# Patient Record
Sex: FEMALE | Race: WHITE | NOT HISPANIC OR LATINO | Employment: UNEMPLOYED | URBAN - METROPOLITAN AREA
[De-identification: names, ages, dates, MRNs, and addresses within clinical notes are randomized per-mention and may not be internally consistent; named-entity substitution may affect disease eponyms.]

---

## 2022-12-15 ENCOUNTER — OFFICE VISIT (OUTPATIENT)
Dept: OBGYN | Facility: CLINIC | Age: 32
End: 2022-12-15
Payer: COMMERCIAL

## 2022-12-15 VITALS
BODY MASS INDEX: 27.48 KG/M2 | HEART RATE: 107 BPM | OXYGEN SATURATION: 98 % | HEIGHT: 66 IN | DIASTOLIC BLOOD PRESSURE: 106 MMHG | SYSTOLIC BLOOD PRESSURE: 154 MMHG | WEIGHT: 171 LBS

## 2022-12-15 DIAGNOSIS — Z30.432 ENCOUNTER FOR REMOVAL OF INTRAUTERINE CONTRACEPTIVE DEVICE: Primary | ICD-10-CM

## 2022-12-15 DIAGNOSIS — Z12.4 PAP SMEAR FOR CERVICAL CANCER SCREENING: ICD-10-CM

## 2022-12-15 PROCEDURE — G0145 SCR C/V CYTO,THINLAYER,RESCR: HCPCS | Performed by: OBSTETRICS & GYNECOLOGY

## 2022-12-15 PROCEDURE — 87624 HPV HI-RISK TYP POOLED RSLT: CPT | Performed by: OBSTETRICS & GYNECOLOGY

## 2022-12-15 PROCEDURE — 58301 REMOVE INTRAUTERINE DEVICE: CPT | Performed by: OBSTETRICS & GYNECOLOGY

## 2022-12-15 RX ORDER — MIRTAZAPINE 15 MG/1
15 TABLET, FILM COATED ORAL AT BEDTIME
COMMUNITY
End: 2024-01-25

## 2022-12-15 NOTE — PROGRESS NOTES
IUD Removal:  SUBJECTIVE:    Is a pregnancy test required: No.  Was a consent obtained?  Yes    Inga Miner is a 32 year old female,, No LMP recorded. who presents today for IUD removal. Her current IUD was placed 5 ago. She has not had problems with the IUD. She requests removal of the IUD because she desires to conceive    Today's PHQ-2 Score: No flowsheet data found.    PROCEDURE:    A speculum exam was performed and the cervix was visualized. The IUD string was visualized. Using ring forceps, the string  was grasped and the IUD removed intact.    A pap smear was obtained.    POST PROCEDURE:    The patient tolerated the procedure well. Patient was discharged in stable condition.    Discussed sending pap smear for HPV typing as well and that if both pap and HPV are negative, then can have q5 year pap smears.    Call if bleeding, pain or fever occur., Birth control counseling given. and Pregnancy counseling given, including folic acid supplementation 800-1000 mg per day.    GÉNESIS LOMAS MD

## 2022-12-15 NOTE — PATIENT INSTRUCTIONS
If cycles not regular (25-35 days) by 2-3 months then let me know    Prenatal or multivitamin daily

## 2022-12-19 LAB
BKR LAB AP GYN ADEQUACY: NORMAL
BKR LAB AP GYN INTERPRETATION: NORMAL
BKR LAB AP GYN OTHER FINDINGS: NORMAL
BKR LAB AP HPV REFLEX: NORMAL
BKR LAB AP PREVIOUS ABNORMAL: NORMAL
PATH REPORT.COMMENTS IMP SPEC: NORMAL
PATH REPORT.COMMENTS IMP SPEC: NORMAL
PATH REPORT.RELEVANT HX SPEC: NORMAL

## 2022-12-21 LAB
HUMAN PAPILLOMA VIRUS 16 DNA: NEGATIVE
HUMAN PAPILLOMA VIRUS 18 DNA: NEGATIVE
HUMAN PAPILLOMA VIRUS FINAL DIAGNOSIS: NORMAL
HUMAN PAPILLOMA VIRUS OTHER HR: NEGATIVE

## 2023-01-23 ENCOUNTER — HEALTH MAINTENANCE LETTER (OUTPATIENT)
Age: 33
End: 2023-01-23

## 2023-03-13 ENCOUNTER — PRENATAL OFFICE VISIT (OUTPATIENT)
Dept: NURSING | Facility: CLINIC | Age: 33
End: 2023-03-13
Payer: COMMERCIAL

## 2023-03-13 VITALS — HEIGHT: 66 IN | BODY MASS INDEX: 27.6 KG/M2

## 2023-03-13 DIAGNOSIS — Z34.00 ENCOUNTER FOR SUPERVISION OF NORMAL FIRST PREGNANCY: Primary | ICD-10-CM

## 2023-03-13 DIAGNOSIS — Z23 NEED FOR TDAP VACCINATION: ICD-10-CM

## 2023-03-13 PROCEDURE — 99207 PR NO CHARGE NURSE ONLY: CPT

## 2023-03-13 RX ORDER — PROPRANOLOL HYDROCHLORIDE 10 MG/1
1 TABLET ORAL
COMMUNITY
Start: 2023-01-12 | End: 2024-01-25

## 2023-03-13 RX ORDER — GABAPENTIN 100 MG/1
CAPSULE ORAL
COMMUNITY
Start: 2023-02-07 | End: 2024-01-25

## 2023-03-13 RX ORDER — BUPROPION HYDROCHLORIDE 300 MG/1
300 TABLET ORAL DAILY
COMMUNITY
Start: 2023-02-14 | End: 2024-01-25

## 2023-03-13 NOTE — PROGRESS NOTES
Important Information for Provider:     New ob nurse intake by phone, first pregnancy. Handouts reviewed. Discussed genetic screening. Patient is going to [place] for her honeymoon this week, discussed air travel/ precautions while there on her trip.   Patient had her IUD removed 12/15/2022, 2 normal periods after removal. Ultrasound and NOB with Dr Lindquist 4/07/2023  She weaned herself off  her antidepressants medications with the guidance of her therapist     Caffeine intake/servings daily - 0  Calcium intake/servings daily - 3  Exercise 5 times weekly - describe ; strength training, precautions given  Sunscreen used - Yes  Seatbelts used - Yes  Guns stored in the home - No  Self Breast Exam - Yes  Pap test up to date -  Yes  Dental exam up to date -  Yes  Immunizations reviewed and up to date - Yes  Abuse: Current or Past (Physical, Sexual or Emotional) - No  Do you feel safe in your environment - Yes  Do you cope well with stress - Yes    Prenatal OB Questionnaire  Patient supplied answers from flow sheet for:  Prenatal OB Questionnaire.  Past Medical History  Have you ever received care for your mental health? : (!) Yes  Have you ever been in a major accident or suffered serious trauma?: No  Within the last year, has anyone hit, slapped, kicked or otherwise hurt you?: No  In the last year, has anyone forced you to have sex when you didn't want to?: No    Past Medical History 2   Have you ever received a blood transfusion?: No  Would you accept a blood transfusion if was medically recommended?: Yes  Does anyone in your home smoke?: No   Is your blood type Rh negative?: Unknown  Have you ever ?: No  Have you been hospitalized for a nonsurgical reason excluding normal delivery?: No  Have you ever had an abnormal pap smear?: No    Past Medical History (Continued)  Do you have a history of abnormalities of the uterus?: No  Did your mother take BARON or any other hormones when she was pregnant with you?:  No  Do you have any other problems we have not asked about which you feel may be important to this pregnancy?: No           Allergies as of 3/13/2023:    Allergies as of 03/13/2023 - Reviewed 03/13/2023   Allergen Reaction Noted     Clindamycin Rash 05/20/2013       Current medications are:  Current Outpatient Medications   Medication Sig Dispense Refill     buPROPion (WELLBUTRIN XL) 300 MG 24 hr tablet Take 300 mg by mouth daily as directed (Patient not taking: Reported on 3/13/2023)       gabapentin (NEURONTIN) 100 MG capsule TAKE 2 CAPSULES BY MOUTH TWICE DAILY FOR ANXIETY (Patient not taking: Reported on 3/13/2023)       mirtazapine (REMERON) 15 MG tablet Take 15 mg by mouth At Bedtime (Patient not taking: Reported on 3/13/2023)       propranolol (INDERAL) 10 MG tablet Take 1 tablet by mouth 2 times daily (Patient not taking: Reported on 3/13/2023)           Early ultrasound screening tool:    Does patient have irregular periods?  No  Did patient use hormonal birth control in the three months prior to positive urine pregnancy test? No  Is the patient breastfeeding?  No  Is the patient 10 weeks or greater at time of education visit?  No

## 2023-04-07 ENCOUNTER — ANCILLARY PROCEDURE (OUTPATIENT)
Dept: ULTRASOUND IMAGING | Facility: CLINIC | Age: 33
End: 2023-04-07
Attending: OBSTETRICS & GYNECOLOGY
Payer: COMMERCIAL

## 2023-04-07 ENCOUNTER — PRENATAL OFFICE VISIT (OUTPATIENT)
Dept: OBGYN | Facility: CLINIC | Age: 33
End: 2023-04-07
Attending: OBSTETRICS & GYNECOLOGY
Payer: COMMERCIAL

## 2023-04-07 VITALS — HEIGHT: 66 IN | SYSTOLIC BLOOD PRESSURE: 142 MMHG | BODY MASS INDEX: 27.6 KG/M2 | DIASTOLIC BLOOD PRESSURE: 64 MMHG

## 2023-04-07 DIAGNOSIS — Z34.00 ENCOUNTER FOR SUPERVISION OF NORMAL FIRST PREGNANCY: ICD-10-CM

## 2023-04-07 DIAGNOSIS — Z34.90 EARLY STAGE OF PREGNANCY: Primary | ICD-10-CM

## 2023-04-07 PROCEDURE — 99213 OFFICE O/P EST LOW 20 MIN: CPT | Performed by: OBSTETRICS & GYNECOLOGY

## 2023-04-07 PROCEDURE — 76817 TRANSVAGINAL US OBSTETRIC: CPT | Performed by: OBSTETRICS & GYNECOLOGY

## 2023-04-07 NOTE — PROGRESS NOTES
"Had u/s today for dating. LMP 1/31/23 and IUD was removed 12/22. +UPT 2/28 or 3/1 at home.    U/s today with small gestational sac measuring 6 weeks 2 days, no fetal pole or yolk sac seen.  No vaginal bleeding.  Should be 9 weeks 3 days by LMP.     Patient is seen here with her  and informed of the results.  Discussed this could be sign of early pregnancy loss and we will need to do a repeat ultrasound to confirm.    OBJECTIVE: Blood pressure (!) 142/64, height 1.676 m (5' 6\"), last menstrual period 01/31/2023.   Physical exam is deferred.     ASSESSMENT: Early pregnancy versus anembryonic pregnancy    PLAN: We will do a repeat ultrasound in 2 weeks and discussed should see fetal pole and cardiac activity at that point if pregnancy is good and just dated incorrectly.    If ultrasound is the same, discussed medication management versus surgical management at today's visit.  Also reviewed she could start experiencing bleeding between now and her scheduled ultrasound which would indicate miscarriage.    Discussed taking ibuprofen if has bleeding and to send us a [employer] message so we can let her know plan for follow-up like UPT in a few weeks, cancel ultrasound, etc. also discussed would check early quants and early ultrasound with her next pregnancy.  Questions were answered.    Lavonne Lindquist MD    "

## 2023-04-21 ENCOUNTER — ANCILLARY PROCEDURE (OUTPATIENT)
Dept: ULTRASOUND IMAGING | Facility: CLINIC | Age: 33
End: 2023-04-21
Attending: OBSTETRICS & GYNECOLOGY
Payer: COMMERCIAL

## 2023-04-21 ENCOUNTER — PRENATAL OFFICE VISIT (OUTPATIENT)
Dept: OBGYN | Facility: CLINIC | Age: 33
End: 2023-04-21
Attending: OBSTETRICS & GYNECOLOGY
Payer: COMMERCIAL

## 2023-04-21 VITALS
BODY MASS INDEX: 26.44 KG/M2 | DIASTOLIC BLOOD PRESSURE: 78 MMHG | SYSTOLIC BLOOD PRESSURE: 127 MMHG | WEIGHT: 163.8 LBS | HEART RATE: 91 BPM

## 2023-04-21 DIAGNOSIS — Z34.90 EARLY STAGE OF PREGNANCY: ICD-10-CM

## 2023-04-21 DIAGNOSIS — O03.9 SPONTANEOUS ABORTION: Primary | ICD-10-CM

## 2023-04-21 PROCEDURE — 76817 TRANSVAGINAL US OBSTETRIC: CPT | Performed by: OBSTETRICS & GYNECOLOGY

## 2023-04-21 PROCEDURE — 99213 OFFICE O/P EST LOW 20 MIN: CPT | Performed by: OBSTETRICS & GYNECOLOGY

## 2023-04-21 RX ORDER — MIFEPRISTONE 200 MG/1
200 TABLET ORAL ONCE
Status: COMPLETED | OUTPATIENT
Start: 2023-04-21 | End: 2023-04-25

## 2023-04-21 RX ORDER — ONDANSETRON 4 MG/1
4 TABLET, FILM COATED ORAL EVERY 8 HOURS PRN
Qty: 5 TABLET | Refills: 0 | Status: SHIPPED | OUTPATIENT
Start: 2023-04-21 | End: 2024-01-25

## 2023-04-21 RX ORDER — MISOPROSTOL 200 UG/1
800 TABLET ORAL ONCE
Qty: 4 TABLET | Refills: 1 | Status: SHIPPED | OUTPATIENT
Start: 2023-04-21 | End: 2023-04-21

## 2023-04-21 NOTE — PATIENT INSTRUCTIONS
You will take the first medication (Mifipristone) orally here in the office      24 hours later you will want to take the Misoprostol, you can take this medication either orally or vaginally depending on what you are more comfortable with                Vaginal: place the four tablets inside the vagina as high up as you can reach                Orally: let the pills dissolve in your cheek for 20 minutes and then swallow whatever is left with a sip of water    You should start to have cramping and vaginal bleeding within 4 to 12 hours, if you do not have any cramping or bleeding within 12 yours you can fill the refill for the Misoprostol and take a second dose.      You should expect to have bleeding like heavy period and can have some nausea and cramping. Please call us if you have heavy bleeding soaking a pad per hour for two hours in a row.      You can take Ibuprofen 600mg every 6 hours and Tylenol 1000mg every 6 hours for pain and cramping. Heating pads and a warm bath/shower will also help. I also gave you a prescription for Oxycodone which is a narcotic pain medication to use if needed and Zofran which is a prescription nausea medication.

## 2023-04-21 NOTE — PROGRESS NOTES
"GYN Progress Note     CC: SAB     HPI: Inga Miner is a 34 YO  at 11w3d by LMP who presents for follow up after an ultrasound this morning confirmed early pregnancy loss. She reports that she has been coping with the loss and denies need for further support at this time. She started to have some light spotting earlier this week but no significant bleeding or abdominal pain.     O:   Vital signs:      BP: 127/78 Pulse: 91             Weight: 74.3 kg (163 lb 12.8 oz)  Estimated body mass index is 26.44 kg/m  as calculated from the following:    Height as of 23: 1.676 m (5' 6\").    Weight as of this encounter: 74.3 kg (163 lb 12.8 oz).      Obstetrical Ultrasound Report  OB U/S  < 14 Weeks -  Transvaginal  [facility]  Referring Provider: Lavonne Lindquist MD  Sonographer: Zaina Gonsalez RDMS  Indication:  Viability check      Dating (mm/dd/yyyy):   LMP: 23                 EDC:  23  GA by LMP:         11w3d     Current Scan On:  2023            The calculation of the gestational age by current scan was based on CRL.  Anatomy Scan:  Schwarz gestation.  Biometry:  CRL                       Not seen               Yolk Sac                              Not seen   Mean gestational sac     2.00 cm       6w6d                                                               Fetal heart activity:  N/A  Findings: Irregular gestational sac in uterus, no yolk sac or fetal pole seen.     Maternal Structures:  Cervix: The cervix appears long and closed.  Right Ovary: Wnl  Left Ovary: Wnl     Impression:   Irregular gestational sac without yolk sac or fetal pole.  This is confirmatory of early pregnancy loss.     Amber Case MD       Assessment and plan:   Inga Miner is a 34 YO  who presents with SAB  (O03.9) Spontaneous   (primary encounter diagnosis)  I discussed with the patient her ultrasound findings and that the pregnancy was not viable. We " discussed that early pregnancy loss is quite common, ~15-20% of recognized pregnancies.  The most common causes of early pregnancy loss is chromosomal abnormality.  Options including expectant management, medical management and surgical management were discussed.     With expectant management pregnancy loss will occur spontaneously.  This has the benefit of least medical intervention, but onset to loss may take days to weeks and she may experience cramping and prolonged bleeding.    Medical management with Mifepristone/misoprostol has the advantage of allowing for control of timing of the onset of cramping and bleeding and generally reduces the overall length to completion of miscarriage.  Side effects including chills, nausea and diarrhea were discussed.     Surgical management with either manual vacuum aspiration in the clinic or suction aspiration in the OR depending on the patient's desire for pain control were discussed. The risk of infection, bleeding, uterine perforation and intrauterine scar formation were discussed.    I also informed her that with either expectant or medical management there is the possibility of failure of the pregnancy to pass completely requiring surgical evacuation. And with surgical evacuation there is a small risk of still needing medication or an additional to complete evacuation of the uterus.    The patient had the opportunity to ask questions, expressed understanding and desires medical management. Mifepristone was given in the office today and she was given a prescription for Misoprostol to take tomorrow. Message sent to RN pool to call on Monday for follow up. If everything goes as expected, will have patient take a home pregnancy test in 4 weeks and monitor for her next period but will obtain a repeat US if any concern for retained POCs.     Patient planing for pregnancy again in the near future, will plan for early US with future pregnancy.     Plan: miFEPRIStone (MIFEPREX)  tablet 200 mg,         misoprostol (CYTOTEC) 200 MCG tablet,         ondansetron (ZOFRAN) 4 MG tablet    Dispo: RN phone call on Monday     Tara Lu MD

## 2023-04-24 ENCOUNTER — TELEPHONE (OUTPATIENT)
Dept: OBGYN | Facility: CLINIC | Age: 33
End: 2023-04-24
Payer: COMMERCIAL

## 2023-04-24 NOTE — CONFIDENTIAL NOTE
Called patient 4/24/23  [facility] for patient to call back.        Tara Lu MD  [facility] Triage  Inga Lopez was seen in the office on 4/21 for medical management of a missed AB. She took the Mifepristone in the office today and plans to take the misoprostol tomorrow.  Can someone call her on Monday and see how she is doing? If her bleeding went as expected she will do a home pregnancy test in 4 weeks but if she has prolonged bleeding lasting longer than 10 days, has a positive home pregnancy test next month or does not get her next period as expected then we should get another ultrasound to rule out retained POCs.     Thank you!   Tara Lu MD

## 2023-04-25 ENCOUNTER — MYC MEDICAL ADVICE (OUTPATIENT)
Dept: OBGYN | Facility: CLINIC | Age: 33
End: 2023-04-25
Payer: COMMERCIAL

## 2023-04-25 PROCEDURE — S0190 MIFEPRISTONE, ORAL, 200 MG: HCPCS | Performed by: OBSTETRICS & GYNECOLOGY

## 2023-04-25 RX ADMIN — MIFEPRISTONE 200 MG: 200 TABLET ORAL at 09:16

## 2023-04-25 NOTE — CONFIDENTIAL NOTE
LVM for patient to call back.  Will sent a [employer] message to check in as well.    Trang Hamm RN

## 2023-04-25 NOTE — TELEPHONE ENCOUNTER
Discussed with Inga regarding the bleeding she experienced.  Pt states that she started bleeding the same day but still took the Miso.  Pt states that her bleeding was heavy for a few days and then it stopped.  Pt confirms spotting but no heavy bleeding. Denies cramping at this time.  Pt education on if/when to take home pregnancy.  Pt verbalized understanding and agreed to the plan.

## 2023-07-07 ENCOUNTER — ANCILLARY PROCEDURE (OUTPATIENT)
Dept: ULTRASOUND IMAGING | Facility: CLINIC | Age: 33
End: 2023-07-07
Attending: OBSTETRICS & GYNECOLOGY
Payer: COMMERCIAL

## 2023-07-07 DIAGNOSIS — N93.8 DUB (DYSFUNCTIONAL UTERINE BLEEDING): ICD-10-CM

## 2023-07-07 PROCEDURE — 76856 US EXAM PELVIC COMPLETE: CPT | Performed by: OBSTETRICS & GYNECOLOGY

## 2023-07-07 PROCEDURE — 76830 TRANSVAGINAL US NON-OB: CPT | Performed by: OBSTETRICS & GYNECOLOGY

## 2024-01-25 ENCOUNTER — OFFICE VISIT (OUTPATIENT)
Dept: OBGYN | Facility: CLINIC | Age: 34
End: 2024-01-25
Payer: COMMERCIAL

## 2024-01-25 VITALS
OXYGEN SATURATION: 95 % | HEIGHT: 66 IN | HEART RATE: 95 BPM | SYSTOLIC BLOOD PRESSURE: 140 MMHG | DIASTOLIC BLOOD PRESSURE: 98 MMHG | BODY MASS INDEX: 26.44 KG/M2

## 2024-01-25 DIAGNOSIS — N92.6 IRREGULAR MENSES: Primary | ICD-10-CM

## 2024-01-25 PROBLEM — Z23 NEED FOR TDAP VACCINATION: Status: RESOLVED | Noted: 2023-03-13 | Resolved: 2024-01-25

## 2024-01-25 PROBLEM — Z78.9 KNOWN HEALTH PROBLEMS: NONE: Status: ACTIVE | Noted: 2024-01-25

## 2024-01-25 PROCEDURE — 99214 OFFICE O/P EST MOD 30 MIN: CPT | Performed by: OBSTETRICS & GYNECOLOGY

## 2024-01-25 RX ORDER — LETROZOLE 2.5 MG/1
TABLET, FILM COATED ORAL
Qty: 10 TABLET | Refills: 0 | Status: SHIPPED | OUTPATIENT
Start: 2024-01-25 | End: 2024-04-09

## 2024-01-25 ASSESSMENT — ANXIETY QUESTIONNAIRES
3. WORRYING TOO MUCH ABOUT DIFFERENT THINGS: NOT AT ALL
IF YOU CHECKED OFF ANY PROBLEMS ON THIS QUESTIONNAIRE, HOW DIFFICULT HAVE THESE PROBLEMS MADE IT FOR YOU TO DO YOUR WORK, TAKE CARE OF THINGS AT HOME, OR GET ALONG WITH OTHER PEOPLE: SOMEWHAT DIFFICULT
6. BECOMING EASILY ANNOYED OR IRRITABLE: NOT AT ALL
7. FEELING AFRAID AS IF SOMETHING AWFUL MIGHT HAPPEN: NOT AT ALL
1. FEELING NERVOUS, ANXIOUS, OR ON EDGE: SEVERAL DAYS
5. BEING SO RESTLESS THAT IT IS HARD TO SIT STILL: NOT AT ALL
GAD7 TOTAL SCORE: 2
GAD7 TOTAL SCORE: 2
2. NOT BEING ABLE TO STOP OR CONTROL WORRYING: NOT AT ALL

## 2024-01-25 ASSESSMENT — PATIENT HEALTH QUESTIONNAIRE - PHQ9
5. POOR APPETITE OR OVEREATING: SEVERAL DAYS
SUM OF ALL RESPONSES TO PHQ QUESTIONS 1-9: 4

## 2024-01-25 NOTE — PATIENT INSTRUCTIONS
Cycle instructions:    The first day of bleeding/period is called Day 1.    Letrozole is taken Days 3-7 of cycle, about same time each day.  Take the medication even if you are still bleeding.    Call when you get your period to schedule an ultrasound to check for follicles on your ovaries.  This should be done about Day 12-14 of cycle.  (we do not have ultrasound on the weekends)  The phone number is [phone]     Start testing for ovulation using the store bought ovulation predictor kits on Day 11 of cycle.  When you get a positive surge, you will most likely be ovulating within the next 24 hours.       Test the urine about the same time each day.         The test is positive when you see 2 dark solid lines, or a smiley face.  (one faint line and one dark line is NOT positive)         Once the test is positive, you can stop testing.  Have sex/intercourse the day the test is positive.  The next day, have sex again.    Schedule a lab only appointment for about 7-9 days after your ovulation test is positive to check the progesterone level and make sure ovulation occurred.    Do an E Visit about Day 24-26 of cycle so we can review all of the labs and ultrasound results and make dose adjustments if needed.       Start the visit by telling me your LMP (last menstrual period), any side effects of letrozole and cycle day of your LH surge.    If you don't have a period by 15 days after LH kit is positive (surge) then do urine pregnancy test and call/email clinic if positive.      Basic info:  The ovulation predictor kits are found in the condom/tampon section of the store.  Clear blue easy brand is simple to read.  Most women will get a positive LH surge before day 20 of the cycle.  Letrozole is not FDA approved, but limited studies showing pregnancy rate higher than clomid especially when clomid has failed.  Aromatase inhibitors are generally well tolerated. The main side effects are hot flushes and gastrointestinal  events (nausea and vomiting), headache, back pain, and leg cramps.  Take it at same time each night to minimize side effects.  There is a chance of twins when taking letrozole as well as making too many cysts on the ovaries.  Do not use lubricants with intercourse when trying to get pregnant. (Pre-Seed, etc is okay)

## 2024-01-25 NOTE — PROGRESS NOTES
"CC: getting pregnant  HPI: Inga Miner is a 34 year old female Patient's last menstrual period was 01/08/2024. Menses are regular q 4 weeks.     The patient has been trying to conceive for 2 years. Had IUD removed 12/22     Ultrasound checked in July 2023 was normal--had to pay out of pocket    Prior pregnancy history is as follows: had SAB 4/23 and took mife/miso    After that since about June 2023 cycles have been regular.  Did some ovulation predictor kits for approximately 2 months and did not see a positive surge.  Mood is not great she is off all medications.  Has appointment to see someone and get back on something    Allergies: Clindamycin                    Past Medical History:   Diagnosis Date    Anxiety     Varicella        Past Surgical History:   Procedure Laterality Date    NO HISTORY OF SURGERY             Social History     Tobacco Use    Smoking status: Never    Smokeless tobacco: Never   Substance Use Topics    Alcohol use: Not Currently              Family History   Problem Relation Age of Onset    Rheumatoid Arthritis Mother     Diabetes Father        Current Outpatient Medications   Medication Sig Dispense Refill    letrozole (FEMARA) 2.5 MG tablet Take 2 tablets by mouth days 3-7 of cycle 10 tablet 0    Prenatal Vit-DSS-Fe Cbn-FA (PRENATAL AD PO)       magnesium oxide 200 MG TABS              PAST INFERTILITY EVALUATION AND TREATMENT:  Patient's work up has included:  Ultrasound 7/23 normal  HSG: No      Past infertility treatment included none.      EXAM:  Blood pressure (!) 140/98, pulse 95, height 1.676 m (5' 6\"), last menstrual period 01/08/2024, SpO2 95%.  BMI= Body mass index is 26.44 kg/m .  Patient's last menstrual period was 01/08/2024.  General - pleasant female in no acute distress.  Neurological - alert and oriented X 3  Psychiatric - normal mood and affect    prep time day of service 5 min  visit time with the patient 18 min  post visit work including documentation time 7 " min  Total time: 30 min    ASSESSMENT/ PLAN:  (N92.6) Irregular menses  (primary encounter diagnosis)  Comment: no +opk seen  Plan: US Pelvis Complete w Transvaginal Follicular         Init, letrozole (FEMARA) 2.5 MG tablet,         Progesterone        Discussed trying Letrozole for ovulation induction.  Not FDA approved, but limited studies showing pregnancy rate higher than clomid stephen. When clomid has failed in PCOS patients.  Aromatase inhibitors are generally well tolerated. The main side effects are hot flushes and gastrointestinal events (nausea and vomiting), headache, back pain, and leg cramps. Patient would like to try Letrozol 5mg days 3-7 of cycle.  Told to take it at same time each night and will call with menses to schedule follicle check about day 12-14 of cycle.  Start LH testing day 11.  Questions answered.  Will call/email with side effects or questions.     Discussed if not pregnant in 3 cycles would need SA/HSG and day 3 labs done. Will defer for now since money is an issue and had one pregnancy <1 yr ago.    Lavonne Lindquist MD

## 2024-01-29 ENCOUNTER — HOSPITAL ENCOUNTER (EMERGENCY)
Facility: HOSPITAL | Age: 34
Discharge: HOME | End: 2024-01-29
Attending: EMERGENCY MEDICINE | Admitting: EMERGENCY MEDICINE
Payer: COMMERCIAL

## 2024-01-29 VITALS
HEART RATE: 98 BPM | SYSTOLIC BLOOD PRESSURE: 152 MMHG | TEMPERATURE: 98.6 F | RESPIRATION RATE: 18 BRPM | DIASTOLIC BLOOD PRESSURE: 97 MMHG | OXYGEN SATURATION: 99 %

## 2024-01-29 DIAGNOSIS — K62.5 RECTAL BLEEDING: ICD-10-CM

## 2024-01-29 DIAGNOSIS — F10.10 ALCOHOL ABUSE: ICD-10-CM

## 2024-01-29 DIAGNOSIS — K70.10 ALCOHOLIC HEPATITIS WITHOUT ASCITES (H): ICD-10-CM

## 2024-01-29 LAB
ALBUMIN SERPL BCG-MCNC: 4.4 G/DL (ref 3.5–5.2)
ALP SERPL-CCNC: 148 U/L (ref 40–150)
ALT SERPL W P-5'-P-CCNC: 252 U/L (ref 0–50)
ANION GAP SERPL CALCULATED.3IONS-SCNC: 23 MMOL/L (ref 7–15)
AST SERPL W P-5'-P-CCNC: 798 U/L (ref 0–45)
BASOPHILS # BLD AUTO: 0 10E3/UL (ref 0–0.2)
BASOPHILS NFR BLD AUTO: 1 %
BILIRUB SERPL-MCNC: 1.5 MG/DL
BUN SERPL-MCNC: 5.6 MG/DL (ref 6–20)
CALCIUM SERPL-MCNC: 9.3 MG/DL (ref 8.6–10)
CHLORIDE SERPL-SCNC: 95 MMOL/L (ref 98–107)
CREAT SERPL-MCNC: 0.57 MG/DL (ref 0.51–0.95)
DEPRECATED HCO3 PLAS-SCNC: 20 MMOL/L (ref 22–29)
EGFRCR SERPLBLD CKD-EPI 2021: >90 ML/MIN/1.73M2
EOSINOPHIL # BLD AUTO: 0 10E3/UL (ref 0–0.7)
EOSINOPHIL NFR BLD AUTO: 0 %
ERYTHROCYTE [DISTWIDTH] IN BLOOD BY AUTOMATED COUNT: 13.9 % (ref 10–15)
GLUCOSE SERPL-MCNC: 84 MG/DL (ref 70–99)
HCT VFR BLD AUTO: 40 % (ref 35–47)
HGB BLD-MCNC: 13.9 G/DL (ref 11.7–15.7)
HOLD SPECIMEN: NORMAL
IMM GRANULOCYTES # BLD: 0 10E3/UL
IMM GRANULOCYTES NFR BLD: 1 %
LIPASE SERPL-CCNC: 69 U/L (ref 13–60)
LYMPHOCYTES # BLD AUTO: 1.4 10E3/UL (ref 0.8–5.3)
LYMPHOCYTES NFR BLD AUTO: 22 %
MCH RBC QN AUTO: 35.8 PG (ref 26.5–33)
MCHC RBC AUTO-ENTMCNC: 34.8 G/DL (ref 31.5–36.5)
MCV RBC AUTO: 103 FL (ref 78–100)
MONOCYTES # BLD AUTO: 0.5 10E3/UL (ref 0–1.3)
MONOCYTES NFR BLD AUTO: 8 %
NEUTROPHILS # BLD AUTO: 4.5 10E3/UL (ref 1.6–8.3)
NEUTROPHILS NFR BLD AUTO: 68 %
NRBC # BLD AUTO: 0 10E3/UL
NRBC BLD AUTO-RTO: 0 /100
PLATELET # BLD AUTO: 258 10E3/UL (ref 150–450)
POTASSIUM SERPL-SCNC: 3.9 MMOL/L (ref 3.4–5.3)
PROT SERPL-MCNC: 8.3 G/DL (ref 6.4–8.3)
RBC # BLD AUTO: 3.88 10E6/UL (ref 3.8–5.2)
SODIUM SERPL-SCNC: 138 MMOL/L (ref 135–145)
WBC # BLD AUTO: 6.5 10E3/UL (ref 4–11)

## 2024-01-29 PROCEDURE — 85025 COMPLETE CBC W/AUTO DIFF WBC: CPT | Performed by: STUDENT IN AN ORGANIZED HEALTH CARE EDUCATION/TRAINING PROGRAM

## 2024-01-29 PROCEDURE — 96374 THER/PROPH/DIAG INJ IV PUSH: CPT

## 2024-01-29 PROCEDURE — 36415 COLL VENOUS BLD VENIPUNCTURE: CPT | Performed by: STUDENT IN AN ORGANIZED HEALTH CARE EDUCATION/TRAINING PROGRAM

## 2024-01-29 PROCEDURE — 80053 COMPREHEN METABOLIC PANEL: CPT | Performed by: EMERGENCY MEDICINE

## 2024-01-29 PROCEDURE — 258N000003 HC RX IP 258 OP 636: Performed by: EMERGENCY MEDICINE

## 2024-01-29 PROCEDURE — 99284 EMERGENCY DEPT VISIT MOD MDM: CPT | Mod: 25

## 2024-01-29 PROCEDURE — 96361 HYDRATE IV INFUSION ADD-ON: CPT

## 2024-01-29 PROCEDURE — 83690 ASSAY OF LIPASE: CPT | Performed by: EMERGENCY MEDICINE

## 2024-01-29 PROCEDURE — 85025 COMPLETE CBC W/AUTO DIFF WBC: CPT | Performed by: EMERGENCY MEDICINE

## 2024-01-29 PROCEDURE — 250N000011 HC RX IP 250 OP 636: Performed by: EMERGENCY MEDICINE

## 2024-01-29 RX ORDER — ONDANSETRON 4 MG/1
4 TABLET, ORALLY DISINTEGRATING ORAL EVERY 8 HOURS PRN
Qty: 10 TABLET | Refills: 0 | Status: SHIPPED | OUTPATIENT
Start: 2024-01-29 | End: 2024-02-01

## 2024-01-29 RX ORDER — FAMOTIDINE 20 MG/1
20 TABLET, FILM COATED ORAL 2 TIMES DAILY
Qty: 30 TABLET | Refills: 0 | Status: SHIPPED | OUTPATIENT
Start: 2024-01-29 | End: 2024-02-07

## 2024-01-29 RX ORDER — CLONIDINE HYDROCHLORIDE 0.1 MG/1
0.1 TABLET ORAL 3 TIMES DAILY
Qty: 15 TABLET | Refills: 0 | Status: SHIPPED | OUTPATIENT
Start: 2024-01-29 | End: 2024-06-20

## 2024-01-29 RX ADMIN — SODIUM CHLORIDE 2000 ML: 9 INJECTION, SOLUTION INTRAVENOUS at 13:49

## 2024-01-29 RX ADMIN — FAMOTIDINE 20 MG: 10 INJECTION, SOLUTION INTRAVENOUS at 12:50

## 2024-01-29 NOTE — ED TRIAGE NOTES
Patient states she has had right upper abdominal pain and blood in stool for the past two days. Patient states she has been drinking approximately 5 white claws a night for the past two weeks. Patient states drinking has become a problem for her and starting to interfere with her everyday life.

## 2024-01-29 NOTE — PROGRESS NOTES
SW assisted LOCO with adding CD resources to Pt's AVS.     DEREK Alvarado   January 29, 2024 3:11 PM

## 2024-01-29 NOTE — DISCHARGE INSTRUCTIONS
Substance Use Disorder Direct Access Resources    It is recommended that you abstain from all mood altering chemicals. Please contact the sober support hotline ([phone]) as needed; phones are answered 24 hours a day, 7 days a week.    To access substance use treatment you must have a comprehensive assessment completed to begin any treatment program.     If uninsured, please contact your county of residence for eligibility screen to substance use disorder evaluation and treatment:    [place] - [phone]   [facility] - [phone]   [facility] [phone]   [place] - [phone]   [place]  [phone]   [place] - [phone]   [facility] [phone]   [place] - [phone]     If you have private insurance, call the customer service number on the back of your insurance card to find an in-network substance abuse use disorder assessment. The ideal provider will be a treatment facility, licensed in the [facility].     Community JASON Evaluations: Clients may call their [facility] for a full list of providers - Availability and services listed belo are subject to change, please call the provider to confirm    [facility]  [phone]  [facility] [place], [address]  *Please call the above number to schedule a comprehensive assessment for determination of level of care needs. In person and virtual appointments available Mon-Fri.    [facility], [facility] [address], [address] Floor, [address], [place] [address] (next to the outpatient lab)    Phone: [phone]   Provides bridging services to people with Opiate Use Disorders (OUD) seeking care. This is a front door to Medication Assisted Treatments (MAT), ages 16+  Walk In hours: Monday-Friday 9:00am-3:00pm    [facility]  [phone]  Walk in Assessments: Mon-Friday 7a-1:45p  [address], [place], [address]    [facility]  Central Access [phone]  [address] [place] [address]  [place], [place], [address]  *by appointment only    [facility]  [phone] (phone consultation available )  Locations in: [place], [place], [facility], and [place], [place]  Upper sorbian virtual IOP [phone] or visit [email or web address]   Also offers LGBTQ programming     [facility]  [phone]  [address] [facility]  [place], [place], [address]  *Currently only offered via telehealth - call to set up an appointment    [facility]  [address] [place], [place], [address]  [facility]  For more information to to make a referral call:  [phone]  Walk-in on   9-11 a.m.    [facility]  [phone]  [address] [place], [place], [address]  *available by appointments only    [facility]  [phone]  [address] [place], [place], [address]  *available by appointment only    [facility]  [phone]  [address] [place], [place], [address]  *walk in assessments available M-F starting at 7 am.    [facility]  [phone]  Locations: [facility], [facility], and [place]  *Walk in assessments availble M-F starting at 8 am -virtual only    [facility]  [phone]  [address] [place] [address]    [facility]  Virtual + Locations: [place], [place], [place], [place], [facility], [place], [place], [place]   [phone]  *available by appointment only    [facility]  [phone]  [address]  [place], [place], [address]    [facility]  [phone]  [address], [place], [address]  *available by appointment    [facility]  [phone]  [address] [place], [place], [address]  *walk ins available M-TH from 9-3    [facility] program: [phone]  [address]  [place], [place], [address]    [facility]  [phone]  Same day substance use disorder assessments are available Monday - Friday, via walk-in or by appointment at the [place] location.  [address], [address], [place] [address]     [facility] - adolescent and adult SUDs services  [phone]  Offer services Monday through Friday, as well as evening hours Monday through Thursday. Normally, a first appointment will be scheduled within one week  [email or web address]  Locations all over [place]    If you are intoxicated, you may be required to detox at a detox facility before starting treatment. The following are detox facilities that you can self present to. All detox facilities are able to help you complete an assessment prior to discharge if you choose:    [facility]: Arrive at a [facility] for immediate medical evaluation    [facility]: [address] [place], [place], [address].         [phone]    [facility]: [address] [place], [place], [address]  [phone]     [facility] [address] [place], [place], [address]  [phone]     [facility]: [address] [place], [place], [address], [phone]         Ways to help cope with sobriety:    -- Take prescribed medicines as scheduled  -- Keep follow-up appointments  -- Talk to others about your concerns  -- Get regular exercise  -- Practice deep breathing skills  -- Eat a healthy diet  -- Use community resources, including hotline numbers, [facility] crisis and support meetings  -- Stay sober and avoid places/people/things associated with substance use  --Maintain a daily schedule/routine  --Get at least 7-8 hours of sleep per night  --Create a list 10--20 healthy activities that you can do that are enjoyable and do not involve substance use  --Create daily goals (approx. 1-4 goals) per day and work to achieve them throughout the day.       Free  Resources:    [facility] ([facility])  [facility] connects people seeking recovery to resources that help foster and sustain long-term recovery. Whether you are seeking resources for treatment, transportation, housing, job training, education, health care or other pathways to recovery, [facility] is a great place to start.  Phone: [phone]. www.[place] Notion.Loop Survey (Great listing of all types of recovery and non-recovery related resources)    Alcoholics Anonymous  Phone: 4-703-ALCOHOL  Website: HTTP://WWW.AA.ORG/  [facility] [place] (916-403-6101 or http://aaSemmx.org)  [facility] (749-933-2182 or www.aastpaul.org)     Narcotics Anonymous  Phone: 590.313.5447  Website: www.Sunlasses.com.ng.Viadeo.    [facility] [address], [place], [place],  Phone: [phone]  Drop-in Hours: Monday-Friday 9-11:30 am. By appointment at other times.  Provides: [facility] is a drop-in center on the east side [facility] that provides a safe space for individuals who are homeless and have a history of chemical use. Sobriety is not a requirement but drugs and alcohol are not allowed on the property.  Services: Non-clients can access drop-in services such as Recovery and Harm Reduction Groups, referrals to case management, community activities, shower facilities, and a pool table. Individuals who are homeless and have chemical health needs may be eligible for enrollment into [facility]'s case management program. Clients and  work together to access benefits, treatment, health care, shelter, and external housing resources.

## 2024-01-29 NOTE — ED PROVIDER NOTES
EMERGENCY DEPARTMENT ENCOUNTER      NAME: Inga Miner  AGE: 34 year old female  YOB: 1990  MRN: [id number]  EVALUATION DATE & TIME: No admission date for patient encounter.    PCP: Outside, Provider    ED PROVIDER: Ron Giron M.D.      Chief Complaint   Patient presents with    Rectal Bleeding         FINAL IMPRESSION:  Rectal bleeding  Alcohol abuse  Alcoholic hepatitis      ED COURSE & MEDICAL DECISION MAKING:    Pertinent Labs & Imaging studies reviewed. (See chart for details)  34 year old female presents to the Emergency Department for evaluation of rectal bleeding and concerns about alcohol use.  Patient reports having bright red blood in her stools for the last few days.  Unable to quantify amounts.  Denies any perirectal discomfort.  Does report some achiness in her right upper quadrant.  Worried as she is drinking 5-6 white claws daily.  Examination done in triage area due to ED workup.  She is well-nourished follow-up female with slight hypertension and tachycardia.  Lungs are clear card exam unremarkable.  Minimal right upper quadrant tenderness.  No significant epigastric tenderness.  No suprapubic tenderness.  Patient reassured that her bleeding was not related to her alcohol use given the bright red nature of it is reflecting lower GI process.  Possibility of diverticular bleed versus internal hemorrhoid.  However given her alcohol use and upper quadrant tenderness.  Baseline labs being obtained assess for anemia related to bleeding.  Also obtain liver function test and lipase over concerns of complications of alcoholism.  Presently no indications for imaging patient appears non toxic with stable vitals signs. Overall exam is benign.  Of note her breath was somewhat ketotic suggesting dehydration.  Given her tachycardia we will proceed with fluid bolus.        12:41 PM I met with the patient for the initial interview and physical examination. Discussed plan for treatment and workup  in the ED.    1:02 PM.  Patient discussed with lab.  AST markedly elevated at nearly 800.  1:32 PM.  Bicarb slightly reduced at 20 with BUN and creatinine normal suggesting dehydration.  Electrolytes normal.  Lipase mildly elevated 69.  Alkaline phosphatase normal.  Total bilirubin slightly elevated at 1.5.  AST at 798 and ALT at 252 suggesting alcoholic hepatitis.  Will reassess after patient receives her fluid bolus.  Presently no indications for hospitalization  2:40 PM.  Situation discussed with patient.  Feeling slightly better after intravenous fluids.  Will discharge on Pepcid and Zofran for symptomatic relief.  Will also provide clonidine for help with mild alcohol withdrawal symptomatology.  Routine return precautions given.  At the conclusion of the encounter I discussed the results of all of the tests and the disposition. The questions were answered and return precautions provided. The patient or family acknowledged understanding and was agreeable with the care plan.       PPE: Provider wore paper mask.     MEDICATIONS GIVEN IN THE EMERGENCY:  Medications   famotidine (PEPCID) injection 20 mg (has no administration in time range)       NEW PRESCRIPTIONS STARTED AT TODAY'S ER VISIT  Current Discharge Medication List        START taking these medications    Details   cloNIDine (CATAPRES) 0.1 MG tablet Take 1 tablet (0.1 mg) by mouth 3 times daily  Qty: 15 tablet, Refills: 0      famotidine (PEPCID) 20 MG tablet Take 1 tablet (20 mg) by mouth 2 times daily  Qty: 30 tablet, Refills: 0      ondansetron (ZOFRAN ODT) 4 MG ODT tab Take 1 tablet (4 mg) by mouth every 8 hours as needed  Qty: 10 tablet, Refills: 0                 =================================================================    HPI        Inga Miner is a 34 year old female with a pertient medical history of anxiety, alcohol use who presents to the ED for evaluation of bright red blood in stools.  Symptoms ongoing for the last few days.   States it is mixed with her stool.  With this she has had some slight achiness in her right upper quadrant.  However does relate having 5-6 white claws daily and is worried about her alcohol use.  Denies any vomiting.  Has had some nausea.  Denies prior episodes of the rectal bleeding.  No recent constipation.      REVIEW OF SYSTEMS   Constitutional:  Denies fever, chills  Respiratory:  Denies productive cough or increased work of breathing  Cardiovascular:  Denies chest pain, palpitations  GI:  Denies abdominal pain, mild nausea, without vomiting.  Patient with blood mixed with stool   musculoskeletal:  Denies any new muscle/joint swelling  Skin:  Denies rash   Neurologic:  Denies focal weakness  All systems negative except as marked.     PAST MEDICAL HISTORY:  Past Medical History:   Diagnosis Date    Anxiety     Varicella        PAST SURGICAL HISTORY:  Past Surgical History:   Procedure Laterality Date    NO HISTORY OF SURGERY           CURRENT MEDICATIONS:      Current Facility-Administered Medications:     famotidine (PEPCID) injection 20 mg, 20 mg, Intravenous, Once, Ron Giron MD    Current Outpatient Medications:     letrozole (FEMARA) 2.5 MG tablet, Take 2 tablets by mouth days 3-7 of cycle, Disp: 10 tablet, Rfl: 0    magnesium oxide 200 MG TABS, , Disp: , Rfl:     Prenatal Vit-DSS-Fe Cbn-FA (PRENATAL AD PO), , Disp: , Rfl:     ALLERGIES:  Allergies   Allergen Reactions    Clindamycin Rash       FAMILY HISTORY:  Family History   Problem Relation Age of Onset    Rheumatoid Arthritis Mother     Diabetes Father        SOCIAL HISTORY:   Social History     Socioeconomic History    Marital status:    Tobacco Use    Smoking status: Never    Smokeless tobacco: Never   Vaping Use    Vaping Use: Never used   Substance and Sexual Activity    Alcohol use: Not Currently    Drug use: Never    Sexual activity: Yes     Partners: Male       VITALS:  Patient Vitals for the past 24 hrs:   BP Temp Temp src Pulse  Resp SpO2   01/29/24 1108 (!) 165/106 98.6  F (37  C) Temporal 116 18 96 %        PHYSICAL EXAM    Constitutional:  Awake, alert, in mild apparent distress  HENT:  Normocephalic, Atraumatic. Bilateral external ears normal. Oropharynx moist. Nose normal. Neck- Normal range of motion with no guarding, No midline cervical tenderness, Supple, No stridor.   Eyes:  PERRL, EOMI with no signs of entrapment, Conjunctiva normal, No discharge.   Respiratory:  Normal breath sounds, No respiratory distress, No wheezing.    Cardiovascular:  Normal heart rate, Normal rhythm, No appreciable rubs or gallops.   GI:  Soft, mild right upper quadrant tenderness, No distension, No palpable masses  Musculoskeletal:  Intact distal pulses, No edema. Good range of motion in all major joints. No tenderness to palpation or major deformities noted.  Integument:  Warm, Dry, No erythema, No rash.   Neurologic:  Alert & oriented, Normal motor function, Normal sensory function, No focal deficits noted.   Psychiatric:  Affect normal, Judgment normal, Mood normal.     LAB:  All pertinent labs reviewed and interpreted.  Results for orders placed or performed during the hospital encounter of 01/29/24   Comprehensive metabolic panel     Status: Abnormal   Result Value Ref Range    Sodium 138 135 - 145 mmol/L    Potassium 3.9 3.4 - 5.3 mmol/L    Carbon Dioxide (CO2) 20 (L) 22 - 29 mmol/L    Anion Gap 23 (H) 7 - 15 mmol/L    Urea Nitrogen 5.6 (L) 6.0 - 20.0 mg/dL    Creatinine 0.57 0.51 - 0.95 mg/dL    GFR Estimate >90 >60 mL/min/1.73m2    Calcium 9.3 8.6 - 10.0 mg/dL    Chloride 95 (L) 98 - 107 mmol/L    Glucose 84 70 - 99 mg/dL    Alkaline Phosphatase 148 40 - 150 U/L     (HH) 0 - 45 U/L     (H) 0 - 50 U/L    Protein Total 8.3 6.4 - 8.3 g/dL    Albumin 4.4 3.5 - 5.2 g/dL    Bilirubin Total 1.5 (H) <=1.2 mg/dL   [place] Draw     Status: None    Narrative    The following orders were created for panel order [place] Draw.  Procedure                                Abnormality         Status                     ---------                               -----------         ------                     Extra Blue Top Tube[[id number]]                              Final result               Extra Red Top Tube[[id number]]                               Final result               Extra Blood Bank Purple ...[[id number]]                      Final result                 Please view results for these tests on the individual orders.   Lipase     Status: Abnormal   Result Value Ref Range    Lipase 69 (H) 13 - 60 U/L   CBC with platelets and differential     Status: Abnormal   Result Value Ref Range    WBC Count 6.5 4.0 - 11.0 10e3/uL    RBC Count 3.88 3.80 - 5.20 10e6/uL    Hemoglobin 13.9 11.7 - 15.7 g/dL    Hematocrit 40.0 35.0 - 47.0 %     (H) 78 - 100 fL    MCH 35.8 (H) 26.5 - 33.0 pg    MCHC 34.8 31.5 - 36.5 g/dL    RDW 13.9 10.0 - 15.0 %    Platelet Count 258 150 - 450 10e3/uL    % Neutrophils 68 %    % Lymphocytes 22 %    % Monocytes 8 %    % Eosinophils 0 %    % Basophils 1 %    % Immature Granulocytes 1 %    NRBCs per 100 WBC 0 <1 /100    Absolute Neutrophils 4.5 1.6 - 8.3 10e3/uL    Absolute Lymphocytes 1.4 0.8 - 5.3 10e3/uL    Absolute Monocytes 0.5 0.0 - 1.3 10e3/uL    Absolute Eosinophils 0.0 0.0 - 0.7 10e3/uL    Absolute Basophils 0.0 0.0 - 0.2 10e3/uL    Absolute Immature Granulocytes 0.0 <=0.4 10e3/uL    Absolute NRBCs 0.0 10e3/uL   Extra Blue Top Tube     Status: None   Result Value Ref Range    Hold Specimen JIC    Extra Red Top Tube     Status: None   Result Value Ref Range    Hold Specimen JI    Extra Blood Bank Purple Top Tube     Status: None   Result Value Ref Range    Hold Specimen JI    CBC with Platelets & Differential     Status: Abnormal    Narrative    The following orders were created for panel order CBC with Platelets & Differential.  Procedure                               Abnormality         Status                     ---------                                -----------         ------                     CBC with platelets and d...[[id number]]  Abnormal            Final result                 Please view results for these tests on the individual orders.        RADIOLOGY:  Reviewed all pertinent imaging. Please see official radiology report.  No orders to display         I, [place], am serving as a scribe to document services personally performed by Ron Giron MD, based on my observation and the provider's statements to me. I, Ron Giron MD attest that [place] is acting in a scribe capacity, has observed my performance of the services and has documented them in accordance with my direction.    Ron Giron M.D.  Emergency Medicine  [facility] EMERGENCY DEPARTMENT     Ron Giron MD  01/29/24 4683

## 2024-02-07 ENCOUNTER — OFFICE VISIT (OUTPATIENT)
Dept: FAMILY MEDICINE | Facility: CLINIC | Age: 34
End: 2024-02-07
Payer: COMMERCIAL

## 2024-02-07 VITALS
HEIGHT: 66 IN | RESPIRATION RATE: 16 BRPM | HEART RATE: 93 BPM | SYSTOLIC BLOOD PRESSURE: 133 MMHG | DIASTOLIC BLOOD PRESSURE: 85 MMHG | BODY MASS INDEX: 26.45 KG/M2 | TEMPERATURE: 98.2 F | OXYGEN SATURATION: 94 % | WEIGHT: 164.6 LBS

## 2024-02-07 DIAGNOSIS — F41.9 ANXIETY: ICD-10-CM

## 2024-02-07 DIAGNOSIS — Z00.00 ROUTINE GENERAL MEDICAL EXAMINATION AT A HEALTH CARE FACILITY: Primary | ICD-10-CM

## 2024-02-07 DIAGNOSIS — R71.8 ELEVATED MCV: ICD-10-CM

## 2024-02-07 DIAGNOSIS — E66.3 OVERWEIGHT: ICD-10-CM

## 2024-02-07 DIAGNOSIS — N92.6 IRREGULAR MENSES: ICD-10-CM

## 2024-02-07 DIAGNOSIS — E78.2 MIXED HYPERLIPIDEMIA: ICD-10-CM

## 2024-02-07 DIAGNOSIS — R79.89 ELEVATED LIVER FUNCTION TESTS: ICD-10-CM

## 2024-02-07 LAB
ERYTHROCYTE [DISTWIDTH] IN BLOOD BY AUTOMATED COUNT: 13.8 % (ref 10–15)
HBA1C MFR BLD: 4.6 % (ref 0–5.6)
HCT VFR BLD AUTO: 36 % (ref 35–47)
HGB BLD-MCNC: 12.6 G/DL (ref 11.7–15.7)
MCH RBC QN AUTO: 37 PG (ref 26.5–33)
MCHC RBC AUTO-ENTMCNC: 35 G/DL (ref 31.5–36.5)
MCV RBC AUTO: 106 FL (ref 78–100)
PLATELET # BLD AUTO: 295 10E3/UL (ref 150–450)
RBC # BLD AUTO: 3.41 10E6/UL (ref 3.8–5.2)
WBC # BLD AUTO: 5 10E3/UL (ref 4–11)

## 2024-02-07 PROCEDURE — 99214 OFFICE O/P EST MOD 30 MIN: CPT | Mod: 25 | Performed by: PHYSICIAN ASSISTANT

## 2024-02-07 PROCEDURE — 36415 COLL VENOUS BLD VENIPUNCTURE: CPT | Performed by: PHYSICIAN ASSISTANT

## 2024-02-07 PROCEDURE — 83721 ASSAY OF BLOOD LIPOPROTEIN: CPT | Mod: 59 | Performed by: PHYSICIAN ASSISTANT

## 2024-02-07 PROCEDURE — 85027 COMPLETE CBC AUTOMATED: CPT | Performed by: PHYSICIAN ASSISTANT

## 2024-02-07 PROCEDURE — 84443 ASSAY THYROID STIM HORMONE: CPT | Performed by: PHYSICIAN ASSISTANT

## 2024-02-07 PROCEDURE — 90686 IIV4 VACC NO PRSV 0.5 ML IM: CPT | Performed by: PHYSICIAN ASSISTANT

## 2024-02-07 PROCEDURE — 80053 COMPREHEN METABOLIC PANEL: CPT | Performed by: PHYSICIAN ASSISTANT

## 2024-02-07 PROCEDURE — 83036 HEMOGLOBIN GLYCOSYLATED A1C: CPT | Performed by: PHYSICIAN ASSISTANT

## 2024-02-07 PROCEDURE — 99385 PREV VISIT NEW AGE 18-39: CPT | Mod: 25 | Performed by: PHYSICIAN ASSISTANT

## 2024-02-07 PROCEDURE — 90471 IMMUNIZATION ADMIN: CPT | Performed by: PHYSICIAN ASSISTANT

## 2024-02-07 PROCEDURE — 84144 ASSAY OF PROGESTERONE: CPT | Performed by: PHYSICIAN ASSISTANT

## 2024-02-07 PROCEDURE — 80061 LIPID PANEL: CPT | Performed by: PHYSICIAN ASSISTANT

## 2024-02-07 RX ORDER — CITALOPRAM HYDROBROMIDE 20 MG/1
20 TABLET ORAL DAILY
Qty: 90 TABLET | Refills: 1 | Status: SHIPPED | OUTPATIENT
Start: 2024-02-07 | End: 2024-02-07

## 2024-02-07 RX ORDER — CITALOPRAM HYDROBROMIDE 20 MG/1
20 TABLET ORAL DAILY
Qty: 90 TABLET | Refills: 1 | Status: SHIPPED | OUTPATIENT
Start: 2024-02-07

## 2024-02-07 SDOH — HEALTH STABILITY: PHYSICAL HEALTH: ON AVERAGE, HOW MANY DAYS PER WEEK DO YOU ENGAGE IN MODERATE TO STRENUOUS EXERCISE (LIKE A BRISK WALK)?: 3 DAYS

## 2024-02-07 ASSESSMENT — SOCIAL DETERMINANTS OF HEALTH (SDOH): HOW OFTEN DO YOU GET TOGETHER WITH FRIENDS OR RELATIVES?: ONCE A WEEK

## 2024-02-07 NOTE — PROGRESS NOTES
"Preventive Care Visit  [facility]  Camilla Zepeda PA-C, Physician Assistant - Medical  Feb 7, 2024    Assessment & Plan     Routine general medical examination at a health care facility  Labs updated today.  Flu shot given.   Pap- UTD  - CBC with platelets  - Comprehensive metabolic panel  - Hemoglobin A1c  - Lipid panel reflex to direct LDL Fasting  - TSH with free T4 reflex  - INFLUENZA VACCINE >6 MONTHS (AFLURIA/FLUZONE)  - CBC with platelets  - Comprehensive metabolic panel  - Hemoglobin A1c  - Lipid panel reflex to direct LDL Fasting  - TSH with free T4 reflex    Overweight  Work on lifestyle changes.    Anxiety  Chronic issue, recently worsening, trying to get pregnant.  Has been coping with excessive alcohol intake, stressful job.  Will refer for counseling, plan to start Celexa and recheck in 1 month.  Has not done well with Wellbutrin and Zoloft in past.  No SI/HI.  - Adult Mental Health  Referral  - citalopram (CELEXA) 20 MG tablet  Dispense: 90 tablet; Refill: 1    Irregular menses  - Progesterone              BMI  Estimated body mass index is 26.57 kg/m  as calculated from the following:    Height as of this encounter: 1.676 m (5' 6\").    Weight as of this encounter: 74.7 kg (164 lb 9.6 oz).   Weight management plan: Discussed healthy diet and exercise guidelines    Counseling  Appropriate preventive services were discussed with this patient, including applicable screening as appropriate for fall prevention, nutrition, physical activity, Tobacco-use cessation, weight loss and cognition.  Checklist reviewing preventive services available has been given to the patient.  Reviewed patient's diet, addressing concerns and/or questions.   She is at risk for lack of exercise and has been provided with information to increase physical activity for the benefit of her well-being.     Patient has been advised of split billing requirements and indicates understanding: " Yes    Risks, benefits and alternatives were discussed with patient. Agreeable to the plan of care.      Donis Xiong is a 34 year old, presenting for the following:  Physical (Not Fasting. Referral for mental health )        2/7/2024     8:51 AM   Additional Questions   Roomed by         Health Care Directive  Patient does not have a Health Care Directive or Living Will: Discussed advance care planning with patient; however, patient declined at this time.    HPI    Tried Atarax, Wellbutrin and Zoloft- not helpful, Gabapentin,   Not tried Buspar or any other selective serotonin reuptake inhibitor  Is trying to get pregnant    Occasional issue after eating needs to have BM  Right after starting  Diarrhea, crampy, no blood  Has not tried food        2/7/2024   General Health   How would you rate your overall physical health? (!) FAIR   Feel stress (tense, anxious, or unable to sleep) Rather much   (!) STRESS CONCERN      2/7/2024   Nutrition   Three or more servings of calcium each day? Yes   Diet: I don't know   How many servings of fruit and vegetables per day? (!) 2-3   How many sweetened beverages each day? 0-1         2/7/2024   Exercise   Days per week of moderate/strenous exercise 3 days         2/7/2024   Social Factors   Frequency of gathering with friends or relatives Once a week   Worry food won't last until get money to buy more No   Food not last or not have enough money for food? No   Do you have housing?  Yes   Are you worried about losing your housing? No   Lack of transportation? No   Unable to get utilities (heat,electricity)? No         2/7/2024   Dental   Dentist two times every year? (!) DECLINE- due for visit         2/7/2024   TB Screening   Were you born outside of [place]?  No           Today's PHQ-2 Score:       1/25/2024     8:43 AM   PHQ-2 ( 1999 [employer])   Q1: Little interest or pleasure in doing things 1   Q2: Feeling down, depressed or hopeless 1   PHQ-2 Score 2         2/7/2024    Substance Use   Alcohol more than 3/day or more than 7/wk Not Applicable   Do you use any other substances recreationally? No     Social History     Tobacco Use    Smoking status: Never    Smokeless tobacco: Never   Vaping Use    Vaping Use: Never used   Substance Use Topics    Alcohol use: Not Currently    Drug use: Never             2/7/2024   Breast Cancer Screening   Family history of breast, colon, or ovarian cancer? No / Unknown      Mammogram Screening - Patient under 40 years of age: Routine Mammogram Screening not recommended.           2/7/2024   One time HIV Screening   Previous HIV test? Yes         2/7/2024   STI Screening   New sexual partner(s) since last STI/HIV test? No     History of abnormal Pap smear: NO - age 30-65 PAP every 5 years with negative HPV co-testing recommended        Latest Ref Rng & Units 12/15/2022     9:23 AM   PAP / HPV   PAP  Negative for Intraepithelial Lesion or Malignancy (NILM)    HPV 16 DNA Negative Negative    HPV 18 DNA Negative Negative    Other HR HPV Negative Negative            2/7/2024   Contraception/Family Planning   Questions about contraception or family planning No        Reviewed and updated as needed this visit by Provider                    Patient Active Problem List   Diagnosis    Anxiety     Past Surgical History:   Procedure Laterality Date    NO HISTORY OF SURGERY         Social History     Tobacco Use    Smoking status: Never    Smokeless tobacco: Never   Substance Use Topics    Alcohol use: Not Currently     Family History   Problem Relation Age of Onset    Rheumatoid Arthritis Mother     Diabetes Father     Breast Cancer Cousin 31         Current Outpatient Medications   Medication Sig Dispense Refill    citalopram (CELEXA) 20 MG tablet Take 1 tablet (20 mg) by mouth daily 90 tablet 1    cloNIDine (CATAPRES) 0.1 MG tablet Take 1 tablet (0.1 mg) by mouth 3 times daily 15 tablet 0    Prenatal Vit-DSS-Fe Cbn-FA (PRENATAL AD PO)       letrozole  "(FEMARA) 2.5 MG tablet Take 2 tablets by mouth days 3-7 of cycle (Patient not taking: Reported on 2/7/2024) 10 tablet 0     Allergies   Allergen Reactions    Clindamycin Rash     Review of Systems    Review of Systems  Constitutional, HEENT, cardiovascular, pulmonary, gi and gu systems are negative, except as otherwise noted.     Objective    Exam  /85   Pulse 93   Temp 98.2  F (36.8  C)   Resp 16   Ht 1.676 m (5' 6\")   Wt 74.7 kg (164 lb 9.6 oz)   LMP 01/08/2024   SpO2 94%   BMI 26.57 kg/m     Estimated body mass index is 26.57 kg/m  as calculated from the following:    Height as of this encounter: 1.676 m (5' 6\").    Weight as of this encounter: 74.7 kg (164 lb 9.6 oz).    Physical Exam  GENERAL: alert and no distress  EYES: Eyes grossly normal to inspection, PERRL and conjunctivae and sclerae normal  HENT: ear canals and TM's normal, nose and mouth without ulcers or lesions  NECK: no adenopathy, no asymmetry, masses, or scars  RESP: lungs clear to auscultation - no rales, rhonchi or wheezes  CV: regular rate and rhythm, normal S1 S2, no S3 or S4, no murmur, click or rub, no peripheral edema  ABDOMEN: soft, nontender, no hepatosplenomegaly, no masses and bowel sounds normal  MS: no gross musculoskeletal defects noted, no edema  SKIN: no suspicious lesions or rashes  NEURO: Normal strength and tone, mentation intact and speech normal  PSYCH: mentation appears normal, affect normal/bright      Signed Electronically by: Camilla Zepeda PA-C    "

## 2024-02-08 LAB
ALBUMIN SERPL BCG-MCNC: 4.3 G/DL (ref 3.5–5.2)
ALP SERPL-CCNC: 129 U/L (ref 40–150)
ALT SERPL W P-5'-P-CCNC: 245 U/L (ref 0–50)
ANION GAP SERPL CALCULATED.3IONS-SCNC: 16 MMOL/L (ref 7–15)
AST SERPL W P-5'-P-CCNC: 422 U/L (ref 0–45)
BILIRUB SERPL-MCNC: 0.7 MG/DL
BUN SERPL-MCNC: 6.3 MG/DL (ref 6–20)
CALCIUM SERPL-MCNC: 9.2 MG/DL (ref 8.6–10)
CHLORIDE SERPL-SCNC: 95 MMOL/L (ref 98–107)
CHOLEST SERPL-MCNC: 486 MG/DL
CREAT SERPL-MCNC: 0.61 MG/DL (ref 0.51–0.95)
DEPRECATED HCO3 PLAS-SCNC: 26 MMOL/L (ref 22–29)
EGFRCR SERPLBLD CKD-EPI 2021: >90 ML/MIN/1.73M2
FASTING STATUS PATIENT QL REPORTED: YES
GLUCOSE SERPL-MCNC: 100 MG/DL (ref 70–99)
HDLC SERPL-MCNC: 84 MG/DL
LDLC SERPL CALC-MCNC: ABNORMAL MG/DL
LDLC SERPL DIRECT ASSAY-MCNC: 304 MG/DL
NONHDLC SERPL-MCNC: 402 MG/DL
POTASSIUM SERPL-SCNC: 3.3 MMOL/L (ref 3.4–5.3)
PROGEST SERPL-MCNC: 8.3 NG/ML
PROT SERPL-MCNC: 7.5 G/DL (ref 6.4–8.3)
SODIUM SERPL-SCNC: 137 MMOL/L (ref 135–145)
TRIGL SERPL-MCNC: 430 MG/DL
TSH SERPL DL<=0.005 MIU/L-ACNC: 0.56 UIU/ML (ref 0.3–4.2)

## 2024-02-09 ENCOUNTER — TELEPHONE (OUTPATIENT)
Dept: FAMILY MEDICINE | Facility: CLINIC | Age: 34
End: 2024-02-09
Payer: COMMERCIAL

## 2024-02-09 PROBLEM — R71.8 ELEVATED MCV: Status: ACTIVE | Noted: 2024-02-09

## 2024-02-09 PROBLEM — E78.2 MIXED HYPERLIPIDEMIA: Status: ACTIVE | Noted: 2024-02-09

## 2024-02-09 PROBLEM — R79.89 ELEVATED LIVER FUNCTION TESTS: Status: ACTIVE | Noted: 2024-02-09

## 2024-02-09 NOTE — TELEPHONE ENCOUNTER
Called patient and left VM to discuss her recent lab findings:    Patient's cholesterol is very high, her LDL is > 300.  This could be from her Letrozole or could be due to familial cholesterol issues.    Recommend she contact her OB/GYN to discuss findings. May need to see lipid specialist if this persists off her Letrozole.    Camilla Zepeda PA-C

## 2024-02-12 ENCOUNTER — TELEPHONE (OUTPATIENT)
Dept: FAMILY MEDICINE | Facility: CLINIC | Age: 34
End: 2024-02-12
Payer: COMMERCIAL

## 2024-02-12 NOTE — TELEPHONE ENCOUNTER
"Left msg to call back. Please relay the following and assist to schedule follow up lab as below.    ----- Message from Camilla Zepeda PA-C sent at 2/9/2024  4:06 PM CST -----  John Xiong,    Here are your recent lab results:    Total cholesterol is high at 486, please continue exercise and watch diet.  Triglycerides are high at 430, this is simple sugar and fat in blood. HDL which is the \"good\" cholesterol (heart protective)  is good at 84. Increase this with more exercise.  The LDL or \"bad\" cholesterol is at 304.    Your cholesterol levels are very high.  This could be from the Letrozole. I recommend you follow up with OB/GYN regarding this. If this is a persistent issue of the Letrozole, would recommend following up with cardiology lipid clinic.    Your CBC shows no evidence of infection or anemia. Your MCV which is cell size is also very high which can be from excessive alcohol intake. We should recheck this including vitamin B12 and folate levels.    Your CMP reveals normal kidney function and electrolytes. Your liver functions remains very high as well and we should continue to monitor them. Recommend recheck in 1 week to ensure improvement. I have placed lab orders, please schedule lab only visit.    Your glucose was 100.    Your thyroid test was normal as well.    Please schedule patient for lab recheck in 1-2 weeks for liver function and vitamin B12 and folate levels.       Camilla Zepeda PA-C      "

## 2024-02-13 NOTE — TELEPHONE ENCOUNTER
Pt returned call, results relayed to pt. Pt stated understanding and has no questions or additional concerns at this time. States that she will call back later today or tomorrow to schedule a lab appointment

## 2024-04-08 ENCOUNTER — E-VISIT (OUTPATIENT)
Dept: OBGYN | Facility: CLINIC | Age: 34
End: 2024-04-08
Payer: COMMERCIAL

## 2024-04-08 ENCOUNTER — MYC MEDICAL ADVICE (OUTPATIENT)
Dept: OBGYN | Facility: CLINIC | Age: 34
End: 2024-04-08

## 2024-04-08 DIAGNOSIS — N92.6 IRREGULAR MENSES: ICD-10-CM

## 2024-04-08 PROCEDURE — 99421 OL DIG E/M SVC 5-10 MIN: CPT | Performed by: OBSTETRICS & GYNECOLOGY

## 2024-04-08 RX ORDER — LETROZOLE 2.5 MG/1
TABLET, FILM COATED ORAL
Qty: 10 TABLET | Refills: 0 | Status: CANCELLED | OUTPATIENT
Start: 2024-04-08

## 2024-04-08 NOTE — TELEPHONE ENCOUNTER
Pt took letrozole prescribed back in January for March cycle, LMP in march 3/12, had + LH surge on 3/25  Pt had period start last night 4/7 and has negative UPT  Pt wondering if she should try letrozole again this cycle (pt currently day 2)  I know we usually do e-visits, want her to do? If so can someone address today so she can start letrozole tomorrow?  Routing to provider to advise.  Jordyn Cummings RN on 4/8/2024 at 11:04 AM

## 2024-04-09 RX ORDER — LETROZOLE 2.5 MG/1
TABLET, FILM COATED ORAL
Qty: 10 TABLET | Refills: 0 | Status: SHIPPED | OUTPATIENT
Start: 2024-04-09 | End: 2024-06-20

## 2024-04-19 ENCOUNTER — ANCILLARY PROCEDURE (OUTPATIENT)
Dept: ULTRASOUND IMAGING | Facility: CLINIC | Age: 34
End: 2024-04-19
Attending: OBSTETRICS & GYNECOLOGY
Payer: COMMERCIAL

## 2024-04-19 DIAGNOSIS — N92.6 IRREGULAR MENSES: ICD-10-CM

## 2024-04-19 PROCEDURE — 76830 TRANSVAGINAL US NON-OB: CPT | Performed by: OBSTETRICS & GYNECOLOGY

## 2024-04-27 ENCOUNTER — LAB (OUTPATIENT)
Dept: LAB | Facility: CLINIC | Age: 34
End: 2024-04-27
Payer: COMMERCIAL

## 2024-04-27 DIAGNOSIS — R71.8 ELEVATED MCV: ICD-10-CM

## 2024-04-27 DIAGNOSIS — N92.6 IRREGULAR MENSES: ICD-10-CM

## 2024-04-27 DIAGNOSIS — R79.89 ELEVATED LIVER FUNCTION TESTS: ICD-10-CM

## 2024-04-27 LAB
ALBUMIN SERPL BCG-MCNC: 4.6 G/DL (ref 3.5–5.2)
ALP SERPL-CCNC: 54 U/L (ref 40–150)
ALT SERPL W P-5'-P-CCNC: 39 U/L (ref 0–50)
AST SERPL W P-5'-P-CCNC: 34 U/L (ref 0–45)
BILIRUB DIRECT SERPL-MCNC: <0.2 MG/DL (ref 0–0.3)
BILIRUB SERPL-MCNC: 0.3 MG/DL
FOLATE SERPL-MCNC: 22 NG/ML (ref 4.6–34.8)
PROGEST SERPL-MCNC: 6.8 NG/ML
PROT SERPL-MCNC: 7.7 G/DL (ref 6.4–8.3)
VIT B12 SERPL-MCNC: 366 PG/ML (ref 232–1245)

## 2024-04-27 PROCEDURE — 80076 HEPATIC FUNCTION PANEL: CPT

## 2024-04-27 PROCEDURE — 82746 ASSAY OF FOLIC ACID SERUM: CPT

## 2024-04-27 PROCEDURE — 84144 ASSAY OF PROGESTERONE: CPT

## 2024-04-27 PROCEDURE — 82607 VITAMIN B-12: CPT

## 2024-04-27 PROCEDURE — 36415 COLL VENOUS BLD VENIPUNCTURE: CPT

## 2024-05-04 ENCOUNTER — E-VISIT (OUTPATIENT)
Dept: OBGYN | Facility: CLINIC | Age: 34
End: 2024-05-04
Payer: COMMERCIAL

## 2024-05-04 DIAGNOSIS — N97.0 OLIGO-OVULATION: Primary | ICD-10-CM

## 2024-05-04 PROCEDURE — 99207 PR NO BILLABLE SERVICE THIS VISIT: CPT | Performed by: OBSTETRICS & GYNECOLOGY

## 2024-05-04 RX ORDER — LETROZOLE 2.5 MG/1
5 TABLET, FILM COATED ORAL DAILY
Qty: 10 TABLET | Refills: 0 | Status: SHIPPED | OUTPATIENT
Start: 2024-05-04 | End: 2024-08-12

## 2024-05-08 RX ORDER — PROGESTERONE 200 MG/1
200 CAPSULE ORAL DAILY
Qty: 90 CAPSULE | Refills: 3 | Status: SHIPPED | OUTPATIENT
Start: 2024-05-08 | End: 2024-06-20

## 2024-05-15 ENCOUNTER — ANCILLARY PROCEDURE (OUTPATIENT)
Dept: ULTRASOUND IMAGING | Facility: CLINIC | Age: 34
End: 2024-05-15
Attending: OBSTETRICS & GYNECOLOGY
Payer: COMMERCIAL

## 2024-05-15 DIAGNOSIS — N97.0 OLIGO-OVULATION: ICD-10-CM

## 2024-05-15 PROCEDURE — 76830 TRANSVAGINAL US NON-OB: CPT | Performed by: OBSTETRICS & GYNECOLOGY

## 2024-05-30 ENCOUNTER — TELEPHONE (OUTPATIENT)
Dept: OBGYN | Facility: CLINIC | Age: 34
End: 2024-05-30

## 2024-05-30 ENCOUNTER — LAB (OUTPATIENT)
Dept: LAB | Facility: HOSPITAL | Age: 34
End: 2024-05-30
Payer: COMMERCIAL

## 2024-05-30 DIAGNOSIS — N92.6 IRREGULAR MENSES: ICD-10-CM

## 2024-05-30 DIAGNOSIS — Z01.818 PRE-OP EXAM: Primary | ICD-10-CM

## 2024-05-30 LAB — TSH SERPL DL<=0.005 MIU/L-ACNC: 0.63 UIU/ML (ref 0.3–4.2)

## 2024-05-30 PROCEDURE — 84443 ASSAY THYROID STIM HORMONE: CPT

## 2024-05-30 PROCEDURE — 82166 ASSAY ANTI-MULLERIAN HORM: CPT

## 2024-05-30 PROCEDURE — 36415 COLL VENOUS BLD VENIPUNCTURE: CPT

## 2024-05-30 PROCEDURE — 84146 ASSAY OF PROLACTIN: CPT

## 2024-05-30 PROCEDURE — 83001 ASSAY OF GONADOTROPIN (FSH): CPT

## 2024-05-30 PROCEDURE — 82670 ASSAY OF TOTAL ESTRADIOL: CPT

## 2024-05-30 PROCEDURE — 83002 ASSAY OF GONADOTROPIN (LH): CPT

## 2024-05-31 LAB
ESTRADIOL SERPL-MCNC: 25 PG/ML
FSH SERPL IRP2-ACNC: 7.7 MIU/ML
LH SERPL-ACNC: 4 MIU/ML
MIS SERPL-MCNC: 1.31 NG/ML (ref 0.58–8.1)
PROLACTIN SERPL 3RD IS-MCNC: 7 NG/ML (ref 5–23)

## 2024-05-31 NOTE — TELEPHONE ENCOUNTER
HSG scheduled at [facility]  Date and time of HS24 1PM  Lab time: 12PM  Performing Provider: Amber Case  LMP Date: 24  UPT ordered: Yes  iMove Message Sent (passcode): Yes  Date: 24     Candace  She/her/hers  [facility]         Yes

## 2024-06-03 ENCOUNTER — HOSPITAL ENCOUNTER (OUTPATIENT)
Dept: GENERAL RADIOLOGY | Facility: CLINIC | Age: 34
Discharge: HOME | End: 2024-06-03
Attending: OBSTETRICS & GYNECOLOGY | Admitting: OBSTETRICS & GYNECOLOGY
Payer: COMMERCIAL

## 2024-06-03 ENCOUNTER — LAB (OUTPATIENT)
Dept: LAB | Facility: CLINIC | Age: 34
End: 2024-06-03
Payer: COMMERCIAL

## 2024-06-03 DIAGNOSIS — N92.6 IRREGULAR MENSES: ICD-10-CM

## 2024-06-03 LAB — HCG UR QL: NEGATIVE

## 2024-06-03 PROCEDURE — 255N000002 HC RX 255 OP 636: Performed by: OBSTETRICS & GYNECOLOGY

## 2024-06-03 PROCEDURE — 74740 X-RAY FEMALE GENITAL TRACT: CPT | Mod: 26 | Performed by: RADIOLOGY

## 2024-06-03 PROCEDURE — 58340 CATHETER FOR HYSTEROGRAPHY: CPT

## 2024-06-03 PROCEDURE — 250N000009 HC RX 250: Performed by: OBSTETRICS & GYNECOLOGY

## 2024-06-03 PROCEDURE — 58340 CATHETER FOR HYSTEROGRAPHY: CPT | Performed by: OBSTETRICS & GYNECOLOGY

## 2024-06-03 PROCEDURE — 81025 URINE PREGNANCY TEST: CPT

## 2024-06-03 RX ORDER — IOPAMIDOL 510 MG/ML
12 INJECTION, SOLUTION INTRAVASCULAR ONCE
Status: COMPLETED | OUTPATIENT
Start: 2024-06-03 | End: 2024-06-03

## 2024-06-03 RX ORDER — LIDOCAINE HYDROCHLORIDE 10 MG/ML
INJECTION, SOLUTION EPIDURAL; INFILTRATION; INTRACAUDAL; PERINEURAL
Status: COMPLETED
Start: 2024-06-03 | End: 2024-06-03

## 2024-06-03 RX ADMIN — IOPAMIDOL 12 ML: 510 INJECTION, SOLUTION INTRAVASCULAR at 13:39

## 2024-06-03 RX ADMIN — LIDOCAINE HYDROCHLORIDE 4 ML: 10 INJECTION, SOLUTION EPIDURAL; INFILTRATION; INTRACAUDAL; PERINEURAL at 13:39

## 2024-06-03 NOTE — PROCEDURES
The patient is seen today for a hysterosalpingogram to discern tubal patency.     Dx:  unexplained infertility    Procedure:  After verbal informed consent was obtained, they patient was placed in dorsal lithotomy on the fluoroscopy table. A speculum was placed in the vagina and the vagina and cervix were prepped with hibiclens. A tenaculum was placed on the anterior lip of the cervix.  Paracervical block was placed using a total of 5cc of 1% lidocaine.  A Worksteady.io cannula was placed into the cervical os and the speculum was removed.    Under fluoroscopy, the Isovue dye was injected.    The radiologic findings will be documented by the interpreting radiologist.    The cannula and tenaculum were removed.    The patient tolerated the procedure well and was discharged to home.      Amber Case MD

## 2024-06-20 ENCOUNTER — OFFICE VISIT (OUTPATIENT)
Dept: OBGYN | Facility: CLINIC | Age: 34
End: 2024-06-20
Payer: COMMERCIAL

## 2024-06-20 VITALS
OXYGEN SATURATION: 98 % | HEART RATE: 88 BPM | WEIGHT: 151 LBS | BODY MASS INDEX: 24.27 KG/M2 | DIASTOLIC BLOOD PRESSURE: 69 MMHG | HEIGHT: 66 IN | SYSTOLIC BLOOD PRESSURE: 115 MMHG

## 2024-06-20 DIAGNOSIS — R93.89 FILLING DEFECT ON IMAGING STUDY: Primary | ICD-10-CM

## 2024-06-20 PROCEDURE — G2211 COMPLEX E/M VISIT ADD ON: HCPCS | Performed by: OBSTETRICS & GYNECOLOGY

## 2024-06-20 PROCEDURE — 99214 OFFICE O/P EST MOD 30 MIN: CPT | Performed by: OBSTETRICS & GYNECOLOGY

## 2024-06-20 NOTE — PATIENT INSTRUCTIONS
[facility]   [email or web address]     [place] location:  [facility]  [address]  [place], [place] [address] [phone]     [place] location:  [facility]  [address], [address]  [place] [id number] [phone]  __________________________________________________    [facility]  [address], [address]  [place], [place]  [address] [phone]  __________________________________________________    [facility]  [email or web address]    [place] Location:  [address], [address]  [place] [address]     [place] Location:  [address], [address]  [place] [address]  [phone]         [facility] surgery scheduling [phone]

## 2024-06-20 NOTE — PROGRESS NOTES
"CC: Follow-up HSG  HPI:  Inga Miner is a 34 year old female Patient's last menstrual period was 05/28/2024.   Patient had normal labs for fertility workup and cycles have been regular every 26 to 28 days.  Had positive ovulation predictor kit on cycle day 14 this month.  Spouse is having semen analysis done tomorrow.  She had HSG performed 6/3 and still images are reviewed at today's visit.  Has a small linear filling defect next to the right cornua.  Noted to have right spillage from the tube left fallopian tube not seen.    Last ultrasound done for follicles on May 15 showed 2 possible endometrial polyps noted.  Here to discuss next steps for pregnancy      Allergies: Clindamycin      EXAM:  Blood pressure 115/69, pulse 88, height 1.676 m (5' 6\"), weight 68.5 kg (151 lb), last menstrual period 05/28/2024, SpO2 98%.    General - pleasant female in no acute distress.  Psychiactric - appropriate mood and affect  Neurological - alert and oriented X 3    ASSESSMENT/PLAN:  prep time day of service 4 min  visit time with the patient 26 min  post visit work including documentation time 8 min  Total time: 38 min    (R93.89) Filling defect on imaging study  (primary encounter diagnosis)  Comment: Right cornua  Plan: Case Request: HYSTEROSCOPY, DIAGNOSTIC        Discussed diagnostic hysteroscopy in order to look at the filling defect and if there are possible polyps.  We specifically reviewed risk of bleeding, infection, possible uterine perferation and need to terminate surgery for possible fluid imbalance. Pt understands and agrees to proceed.    Will get semen analysis results and based on both of the above, next step may be proceeding to [facility].  She was given a list of clinics to investigate.    Lavonne Lindquist MD  "

## 2024-06-21 ENCOUNTER — TELEPHONE (OUTPATIENT)
Dept: OBGYN | Facility: CLINIC | Age: 34
End: 2024-06-21
Payer: COMMERCIAL

## 2024-06-24 ENCOUNTER — HOSPITAL ENCOUNTER (OUTPATIENT)
Facility: AMBULATORY SURGERY CENTER | Age: 34
End: 2024-06-24
Attending: OBSTETRICS & GYNECOLOGY
Payer: COMMERCIAL

## 2024-06-24 PROBLEM — R93.89 FILLING DEFECT ON IMAGING STUDY: Status: ACTIVE | Noted: 2024-06-20

## 2024-06-24 NOTE — TELEPHONE ENCOUNTER
Type of surgery: gyn  Location of surgery: [facility]  Date and time of surgery: 08/09/2024 8:05AM  Surgeon: Dr. Lavonne Lindquist  Pre-Op Appt Date: 07/23/2024  Post-Op Appt Date: n/a   Packet sent out: Yes  Pre-cert/Authorization completed:  Not Applicable  Date: 06/24/2024

## 2024-07-09 RX ORDER — ACETAMINOPHEN 325 MG/1
975 TABLET ORAL ONCE
Status: CANCELLED | OUTPATIENT
Start: 2024-08-09 | End: 2024-08-09

## 2024-08-12 ENCOUNTER — VIRTUAL VISIT (OUTPATIENT)
Dept: OBGYN | Facility: CLINIC | Age: 34
End: 2024-08-12
Payer: COMMERCIAL

## 2024-08-12 VITALS — HEIGHT: 66 IN | BODY MASS INDEX: 24.37 KG/M2

## 2024-08-12 DIAGNOSIS — Z83.3 FAMILY HISTORY OF DIABETES MELLITUS: ICD-10-CM

## 2024-08-12 DIAGNOSIS — Z23 NEED FOR DIPHTHERIA-TETANUS-PERTUSSIS (TDAP) VACCINE: ICD-10-CM

## 2024-08-12 DIAGNOSIS — O09.299 HISTORY OF MISCARRIAGE, CURRENTLY PREGNANT: ICD-10-CM

## 2024-08-12 DIAGNOSIS — O09.529 ENCOUNTER FOR SUPERVISION OF MULTIGRAVIDA WITH ADVANCED MATERNAL AGE: Primary | ICD-10-CM

## 2024-08-12 PROBLEM — R79.89 ELEVATED LIVER FUNCTION TESTS: Status: RESOLVED | Noted: 2024-02-09 | Resolved: 2024-08-12

## 2024-08-12 PROCEDURE — 99207 PR NO CHARGE NURSE ONLY: CPT | Mod: 93

## 2024-08-12 RX ORDER — CITALOPRAM HYDROBROMIDE 40 MG/1
1 TABLET ORAL
COMMUNITY
Start: 2024-07-29 | End: 2024-08-12

## 2024-08-12 RX ORDER — PROPRANOLOL HYDROCHLORIDE 10 MG/1
10 TABLET ORAL 2 TIMES DAILY PRN
COMMUNITY
Start: 2024-06-06 | End: 2024-08-12

## 2024-08-12 NOTE — PROGRESS NOTES
Important Information for Provider:     New ob nurse intake by phone, second pregnancy, AMA. Recent SAB 4/23/2023. Handouts reviewed. Discussed genetic screening. Ultrasound and NOB with Dr Townsend 8/19/2024. Denies any problems at this time      Caffeine intake/servings daily - 0  Calcium intake/servings daily - 3  Exercise 5 times weekly - describe ; strength training, precautions given  Sunscreen used - Yes  Seatbelts used - Yes  Guns stored in the home - No  Self Breast Exam - Yes  Pap test up to date -  Yes  Dental exam up to date -  Yes  Immunizations reviewed and up to date - Yes  Abuse: Current or Past (Physical, Sexual or Emotional) - No  Do you feel safe in your environment - Yes  Do you cope well with stress - Yes  Prenatal OB Questionnaire     Patient supplied answers from flow sheet for:  Prenatal OB Questionnaire.  Past Medical History  Have you ever recieved care for your mental health? : (!) Yes  Have you ever been in a major accident or suffered serious trauma?: No  Within the last year, has anyone hit, slapped, kicked or otherwise hurt you?: No  In the last year, has anyone forced you to have sex when you didn't want to?: No    Past Medical History 2   Have you ever received a blood transfusion?: No  Would you accept a blood transfusion if was medically recommended?: Yes  Does anyone in your home smoke?: No   Is your blood type Rh negative?: Unknown  Have you ever ?: No  Have you been hospitalized for a nonsurgical reason excluding normal delivery?: No  Have you ever had an abnormal pap smear?: No    Past Medical History (Continued)  Do you have a history of abnormalities of the uterus?: No  Did your mother take BARON or any other hormones when she was pregnant with you?: No  Do you have any other problems we have not asked about which you feel may be important to this pregnancy?: No            Allergies as of 8/12/2024:    Allergies as of 08/12/2024 - Reviewed 08/12/2024   Allergen Reaction  Noted    Clindamycin Rash 05/20/2013               Early ultrasound screening tool:    Does patient have irregular periods?  No  Did patient use hormonal birth control in the three months prior to positive urine pregnancy test? No  Is the patient breastfeeding?  No  Is the patient 10 weeks or greater at time of education visit?  No

## 2024-08-18 LAB
ABO/RH(D): NORMAL
ANTIBODY SCREEN: NEGATIVE
SPECIMEN EXPIRATION DATE: NORMAL

## 2024-08-19 ENCOUNTER — ANCILLARY PROCEDURE (OUTPATIENT)
Dept: ULTRASOUND IMAGING | Facility: CLINIC | Age: 34
End: 2024-08-19
Payer: COMMERCIAL

## 2024-08-19 ENCOUNTER — TRANSCRIBE ORDERS (OUTPATIENT)
Dept: MATERNAL FETAL MEDICINE | Facility: CLINIC | Age: 34
End: 2024-08-19

## 2024-08-19 ENCOUNTER — PRENATAL OFFICE VISIT (OUTPATIENT)
Dept: OBGYN | Facility: CLINIC | Age: 34
End: 2024-08-19
Payer: COMMERCIAL

## 2024-08-19 VITALS
SYSTOLIC BLOOD PRESSURE: 120 MMHG | DIASTOLIC BLOOD PRESSURE: 76 MMHG | WEIGHT: 147.1 LBS | BODY MASS INDEX: 23.64 KG/M2 | HEIGHT: 66 IN | OXYGEN SATURATION: 98 % | TEMPERATURE: 97 F | HEART RATE: 72 BPM

## 2024-08-19 DIAGNOSIS — O09.529 ENCOUNTER FOR SUPERVISION OF MULTIGRAVIDA WITH ADVANCED MATERNAL AGE: ICD-10-CM

## 2024-08-19 DIAGNOSIS — O26.90 PREGNANCY RELATED CONDITION, ANTEPARTUM: Primary | ICD-10-CM

## 2024-08-19 DIAGNOSIS — Z34.01 ENCOUNTER FOR SUPERVISION OF NORMAL FIRST PREGNANCY IN FIRST TRIMESTER: Primary | ICD-10-CM

## 2024-08-19 DIAGNOSIS — Z83.3 FAMILY HISTORY OF DIABETES MELLITUS: ICD-10-CM

## 2024-08-19 LAB
ALBUMIN UR-MCNC: NEGATIVE MG/DL
APPEARANCE UR: CLEAR
BILIRUB UR QL STRIP: NEGATIVE
COLOR UR AUTO: YELLOW
ERYTHROCYTE [DISTWIDTH] IN BLOOD BY AUTOMATED COUNT: 11.9 % (ref 10–15)
GLUCOSE UR STRIP-MCNC: NEGATIVE MG/DL
HBA1C MFR BLD: 4.7 % (ref 0–5.6)
HBV SURFACE AG SERPL QL IA: NONREACTIVE
HCT VFR BLD AUTO: 39 % (ref 35–47)
HCV AB SERPL QL IA: NONREACTIVE
HGB BLD-MCNC: 13.4 G/DL (ref 11.7–15.7)
HGB UR QL STRIP: NEGATIVE
HIV 1+2 AB+HIV1 P24 AG SERPL QL IA: NONREACTIVE
KETONES UR STRIP-MCNC: NEGATIVE MG/DL
LEUKOCYTE ESTERASE UR QL STRIP: NEGATIVE
MCH RBC QN AUTO: 32.7 PG (ref 26.5–33)
MCHC RBC AUTO-ENTMCNC: 34.4 G/DL (ref 31.5–36.5)
MCV RBC AUTO: 95 FL (ref 78–100)
NITRATE UR QL: NEGATIVE
PH UR STRIP: 6 [PH] (ref 5–7)
PLATELET # BLD AUTO: 271 10E3/UL (ref 150–450)
RBC # BLD AUTO: 4.1 10E6/UL (ref 3.8–5.2)
SP GR UR STRIP: <=1.005 (ref 1–1.03)
T PALLIDUM AB SER QL: NONREACTIVE
UROBILINOGEN UR STRIP-ACNC: 0.2 E.U./DL
WBC # BLD AUTO: 8.9 10E3/UL (ref 4–11)

## 2024-08-19 PROCEDURE — 86900 BLOOD TYPING SEROLOGIC ABO: CPT | Performed by: OBSTETRICS & GYNECOLOGY

## 2024-08-19 PROCEDURE — 87389 HIV-1 AG W/HIV-1&-2 AB AG IA: CPT | Performed by: OBSTETRICS & GYNECOLOGY

## 2024-08-19 PROCEDURE — 86762 RUBELLA ANTIBODY: CPT | Performed by: OBSTETRICS & GYNECOLOGY

## 2024-08-19 PROCEDURE — 87340 HEPATITIS B SURFACE AG IA: CPT | Performed by: OBSTETRICS & GYNECOLOGY

## 2024-08-19 PROCEDURE — 76817 TRANSVAGINAL US OBSTETRIC: CPT | Performed by: OBSTETRICS & GYNECOLOGY

## 2024-08-19 PROCEDURE — 85027 COMPLETE CBC AUTOMATED: CPT | Performed by: OBSTETRICS & GYNECOLOGY

## 2024-08-19 PROCEDURE — 86803 HEPATITIS C AB TEST: CPT | Performed by: OBSTETRICS & GYNECOLOGY

## 2024-08-19 PROCEDURE — 81003 URINALYSIS AUTO W/O SCOPE: CPT | Performed by: OBSTETRICS & GYNECOLOGY

## 2024-08-19 PROCEDURE — 83036 HEMOGLOBIN GLYCOSYLATED A1C: CPT | Performed by: OBSTETRICS & GYNECOLOGY

## 2024-08-19 PROCEDURE — 86901 BLOOD TYPING SEROLOGIC RH(D): CPT | Performed by: OBSTETRICS & GYNECOLOGY

## 2024-08-19 PROCEDURE — 36415 COLL VENOUS BLD VENIPUNCTURE: CPT | Performed by: OBSTETRICS & GYNECOLOGY

## 2024-08-19 PROCEDURE — 86850 RBC ANTIBODY SCREEN: CPT | Performed by: OBSTETRICS & GYNECOLOGY

## 2024-08-19 PROCEDURE — 99213 OFFICE O/P EST LOW 20 MIN: CPT | Performed by: OBSTETRICS & GYNECOLOGY

## 2024-08-19 PROCEDURE — 87086 URINE CULTURE/COLONY COUNT: CPT | Performed by: OBSTETRICS & GYNECOLOGY

## 2024-08-19 PROCEDURE — 86780 TREPONEMA PALLIDUM: CPT | Performed by: OBSTETRICS & GYNECOLOGY

## 2024-08-19 NOTE — PROGRESS NOTES
CC: New Ob visit  HPI: Inga Miner is a 34 year old  here for new Ob visit.  Patient's last menstrual period was 2024..  She is 8w2d by known LMP and c/w us today, giving her an EDC of 3/29/25.  The pregnancy was planned and she and her partner are feeling excited.    This far the pregnancy has been uneventful other than faitgue.  She has been taking progesterone since she had one prior miscarriage, planning to continue until 10 weeks.    She has a distant h/o chronic urticaria of unknown cause.  Hasn't really had since teen years, but started again last week.  Can't find any triggers.  Using zyrtec with relief.    Past Medical History:   Diagnosis Date    Anxiety 2024    Varicella        Past Surgical History:   Procedure Laterality Date    NO HISTORY OF SURGERY       OB History    Para Term  AB Living   2 0 0 0 1 0   SAB IAB Ectopic Multiple Live Births   1 0 0 0 0      # Outcome Date GA Lbr Chaparro/2nd Weight Sex Type Anes PTL Lv   2 Current            1 SAB 23 6w0d    SAB         Birth Comments: anembryonic pregnancy, took mife/miso       Current Outpatient Medications:     citalopram (CELEXA) 20 MG tablet, Take 1 tablet (20 mg) by mouth daily, Disp: 90 tablet, Rfl: 1    Prenatal Vit-DSS-Fe Cbn-FA (PRENATAL AD PO), , Disp: , Rfl:     PROGESTERONE PO, Take 200 mg by mouth, Disp: , Rfl:     Allergies   Allergen Reactions    Clindamycin Rash       Family History   Problem Relation Age of Onset    Rheumatoid Arthritis Mother     Diabetes Father     Diabetes Sister     Breast Cancer Cousin 31       Past medical, social, surgical and family history were reviewed and updated in EPIC.    ROS: No TIA's or unusual headaches, no dysphagia.  No prolonged cough. No dyspnea or chest pain on exertion.  No abdominal pain, change in bowel habits, black or bloody stools.  No urinary tract symptoms.  No new or unusual musculoskeletal symptoms.  Normal menses, no abnormal vaginal bleeding, discharge  "or unexpected pelvic pain. No new breast lumps, breast pain or nipple discharge.    PE: /76   Pulse 72   Temp 97  F (36.1  C)   Ht 1.676 m (5' 6\")   Wt 66.7 kg (147 lb 1.6 oz)   LMP 06/22/2024   SpO2 98%   BMI 23.74 kg/m      Gen: Healthy appearing female in no acute distress  Heart: RRR  Lungs: CTAB  Abd: +BS, SNT  Ex: No C/C/E, no suspicious rashes or lesions    Dating (mm/dd/yyyy):   LMP: 06/22/2024            EDC:  03/29/2025            GA by LMP:         8w2d     Current Scan On:  8/19/2024          EDC:  03/30/2025            GA by Current Scan:        8w1d  The calculation of the gestational age by current scan was based on CRL.     Anatomy Scan:  Schwarz gestation.     Biometry:  Gest Sac MSD                2.96cm  CRL                                1.73 cm                8w1d                      Yolk Sac                         2.5 mm                                                   Fetal heart activity:  Rate and rhythm is within normal limits.  Fetal heart rate: 171 bpm  Findings: Single live IUP     Maternal Structures:  Cervix: The cervix appears long and closed.  Right Ovary: Wnl and Corpus luteum  Left Ovary: Wnl  Technique:Transvaginal Imaging performed     Impression:   Early schwarz viable intrauterine pregnancy with yolk sac and cardiac activity, measures 8w 1d by today's ultrasound, which is consistent with menstrual dating. Estimated Date of Delivery remains Mar 29, 2025 .     Amber Echols MD    A/P:  1) IUP at 8w2d, AMA, chronic hives        PNL today, pap UTD        Reviewed anticipated course of prenatal care        Reviewed recommendations for weight gain, activity and diet        We discussed options for genetic screening and diagnosis including CVS/amnio, first trimester screen and quad screen.  We discussed a level 2 fetal survey will be performed around 18-20 weeks. She desires genetic screening        Discussed MD call schedule as well as role of residents " and med students both in clinic and hospital.  She is ok with resident care       RTC 4 weeks    Abby Townsend MD

## 2024-08-20 LAB — BACTERIA UR CULT: NO GROWTH

## 2024-08-21 LAB
RUBV IGG SERPL QL IA: 0.95 INDEX
RUBV IGG SERPL QL IA: NORMAL

## 2024-09-06 ENCOUNTER — PRE VISIT (OUTPATIENT)
Dept: MATERNAL FETAL MEDICINE | Facility: HOSPITAL | Age: 34
End: 2024-09-06
Payer: COMMERCIAL

## 2024-09-11 ENCOUNTER — OFFICE VISIT (OUTPATIENT)
Dept: MATERNAL FETAL MEDICINE | Facility: HOSPITAL | Age: 34
End: 2024-09-11
Attending: OBSTETRICS & GYNECOLOGY
Payer: COMMERCIAL

## 2024-09-11 ENCOUNTER — ANCILLARY PROCEDURE (OUTPATIENT)
Dept: ULTRASOUND IMAGING | Facility: HOSPITAL | Age: 34
End: 2024-09-11
Attending: OBSTETRICS & GYNECOLOGY
Payer: COMMERCIAL

## 2024-09-11 ENCOUNTER — LAB (OUTPATIENT)
Dept: LAB | Facility: HOSPITAL | Age: 34
End: 2024-09-11
Attending: OBSTETRICS & GYNECOLOGY
Payer: COMMERCIAL

## 2024-09-11 DIAGNOSIS — Z36.0 ENCOUNTER FOR ANTENATAL SCREENING FOR CHROMOSOMAL ANOMALIES: ICD-10-CM

## 2024-09-11 DIAGNOSIS — Z31.5 ENCOUNTER FOR PROCREATIVE GENETIC COUNSELING: ICD-10-CM

## 2024-09-11 DIAGNOSIS — Z36.9 ENCOUNTER FOR ANTENATAL SCREENING: ICD-10-CM

## 2024-09-11 DIAGNOSIS — O26.90 PREGNANCY RELATED CONDITION, ANTEPARTUM: ICD-10-CM

## 2024-09-11 DIAGNOSIS — Z36.9 ENCOUNTER FOR ANTENATAL SCREENING: Primary | ICD-10-CM

## 2024-09-11 DIAGNOSIS — O09.521 MULTIGRAVIDA OF ADVANCED MATERNAL AGE IN FIRST TRIMESTER: ICD-10-CM

## 2024-09-11 DIAGNOSIS — O09.521 MULTIGRAVIDA OF ADVANCED MATERNAL AGE IN FIRST TRIMESTER: Primary | ICD-10-CM

## 2024-09-11 PROCEDURE — 76813 OB US NUCHAL MEAS 1 GEST: CPT

## 2024-09-11 PROCEDURE — 96040 HC GENETIC COUNSELING, EACH 30 MINUTES: CPT

## 2024-09-11 PROCEDURE — 36415 COLL VENOUS BLD VENIPUNCTURE: CPT

## 2024-09-11 PROCEDURE — 99207 PR NO CHARGE LOS: CPT | Performed by: OBSTETRICS & GYNECOLOGY

## 2024-09-11 PROCEDURE — 76813 OB US NUCHAL MEAS 1 GEST: CPT | Mod: 26 | Performed by: OBSTETRICS & GYNECOLOGY

## 2024-09-11 NOTE — NURSING NOTE
Inga Miner is a  at 11w4d who presents to [facility] for GC and NT ultrasound. Inga will have NIPT drawn today after her ultrasound. Pt denies bldg/lof/change in discharge, contractions, headache, vision changes, chest pain/SOB or edema. SBAR given to Dr. Smith, see note in Epic.

## 2024-09-11 NOTE — PROGRESS NOTES
[facility]  Genetic Counseling Consult    Patient:  Inga Miner  Preferred Name: Inga YOB: 1990   Date of Service:  9/11/24   MRN: [id number]    Inga was seen at the [facility] for genetic consultation. The indication for genetic counseling is advanced maternal age. The patient was accompanied to this visit by their partner, Larry.    The session was conducted in English.      IMPRESSION/ PLAN   1. Inga has not had genetic screening in this pregnancy but elected to have screening today.     2. During today's [facility] visit, Inga had a blood draw for non-invasive prenatal testing (also called NIPT, NIPS, or cell-free DNA) through [facility] ([facility]). This NIPT screens for trisomy 21, 18, and 13 and the patient opted to screen for sex chromosome aneuploidies, including reported fetal sex. Results are expected in 7-10 days. The patient will be called with results and if they do not answer they requested a detailed message with results on their voicemail, NOT including the predicted fetal sex information. Instead, they would like the sex information on their own in [employer]. Patient was informed that results, including fetal sex, will be available in [facility].    3. Since the patient chose aneuploidy screening via NIPT, quad screen is NOT recommended in the second trimester. If the patient desires screening for open neural tube defects, maternal serum AFP only is recommended, ideally between 16-18 weeks gestation.    4. The patient was not certain about whether to pursue carrier screening today. They can contact us if they would like to pursue screening. A brochure on carrier screening was provided.     5. Inga had a nuchal translucency ultrasound today. Please see the ultrasound report for further details.    6. Further recommendations include a fetal anatomy level II ultrasound with [facility]. The upcoming ultrasound has been  "scheduled for 10/28/2024.    PREGNANCY HISTORY   /Parity:       Inga's pregnancy history is significant for:   SAB at 6w ()    CURRENT PREGNANCY   Current Age: 34 year old   Age at Delivery: 35 year old  SUN: 3/29/2025, by Last Menstrual Period                                   Gestational Age: 11w4d  This pregnancy is a single gestation.     MEDICAL HISTORY   Inga s reported medical history is not expected to impact pregnancy management or risks to fetal development.       FAMILY HISTORY   A three-generation pedigree was obtained today and is scanned under the \"Media\" tab in Epic. The family history was reported by Inga and their partner.    The following significant findings were reported today for Inga's family:   Father: DM2  Mother: rheumatoid arthritis  Sister: DM1  Maternal cousin (female): breast cancer at age 28; negative genetic testing reported    Larry reports that he is 34 years of age with a personal history of MS diagnosed at 33. The following significant findings were reported today for Larry's family:   Father: heart concern, possibly atrial fibrillation, beginning in his 50s-60s  Mother: possibly a brain aneurysm in her 50s  Maternal uncle: passed away from a brain aneurysm at ~40    Otherwise, the reported family history is unremarkable for multiple miscarriages, stillbirths, infant deaths, birth defects, intellectual disabilities, developmental delays, autism, known genetic conditions, other cancer under the age of 50, other sudden unexplained death under age 50, and consanguinity.     Cancer  We discussed how most cancer seen in families occurs sporadically, but about 5-10% may be due to an underlying genetic etiology. We discussed that cancer diagnosed under the age of 50 raises suspicion for a potential hereditary cancer syndrome. Other characteristics that may suggest a hereditary cancer syndrome include cancer in 2 or more close relatives on the same side of the family, " multiple primary tumors, bilateral or multiple rare cancers, constellations of tumors associated with a specific cancer syndrome, and evidence of autosomal dominant transmission.     Inga's cousin meets criteria for cancer genetic testing, and Inga reports this relative has had testing with likely negative results. Inga was encouraged to share cancer family history with their health care providers. Even if no hereditary cancer syndrome is identified in a family, individuals may be eligibile for increased screening or risk management options given a family history.     Cardiovascular Health  Cardiovascular concerns may be related to environmental, lifestyle, or behavioral factors. Cardiovascular disease may also have multifactorial components (both predisposing genetic factors and environmental factors). Some cardiovascular conditions are due to genetic conditions and can carry up to a 50% recurrence risk for relatives of affected individuals. Many cardiovascular conditions that are inherited in a dominant matter can also have reduced penetrance or age-related penetrance. Patients are encouraged to share cardiovascular family history with their health care providers.     Larry is also encouraged to share family history of brain aneurysms with his providers.    Autoimmune Disorders  Autoimmune conditions are often multifactorial disorders, resulting from both genetic and non-genetic factors. Once an autoimmune disease is present in a family, other relatives may be at increased risk to develop the same disease or a different autoimmune disease.  Presymptomatic and prenatal testing are not available for autoimmune disorders.     Multiple sclerosis (MS) is an autoimmune disease of the central nervous system, characterized by focal inflammation, demyelination, and axonal injury. MS is thought to be multifactorial, meaning a combination of environmental factors and variations in dozens of genes are involved in the  development of MS. Given that there is a genetic component, the risk of developing MS is higher for siblings or children of a person with the condition than for the general population.        RISK ASSESSMENT FOR INHERITED CONDITIONS AND CARRIER SCREENING OPTIONS   Expanded carrier screening is available to screen for autosomal recessive conditions and X-linked conditions in a large list of genes. Carrier screening does not test the pregnancy but gives a risk assessment for the pregnancy and future pregnancies to have the condition. Expanded carrier screening is designed to identify carrier status for conditions that are primarily childhood or adolescent onset. Expanded carrier screening does not evaluate for adult-onset conditions such as hereditary cancer syndromes, dementia/Alzheimer's disease, or cardiovascular disease risk factors. Additionally, expanded carrier screening is not comprehensive for all known genetic diseases or inherited conditions. Carrier screening does not test for all genetic and health conditions or risk factors.     Autosomal recessive conditions happen when a mutation has been inherited from the egg and sperm and include conditions like cystic fibrosis, thalassemia, hearing loss, spinal muscular atrophy, and more. We reviewed that when both biological parents carry a harmful genetic change in a gene associated with autosomal recessive inheritance, each of their pregnancies has a 1 in 4 (25%) chance to be affected by that condition. X-linked conditions happen when a mutation has been inherited from the egg and include conditions like fragile X syndrome.With X-linked conditions, the specific risk generally depends on the chromosomal sex of the fetus, with XY individuals (generally male) being most severely affected.      screening was reviewed. About [place]  Screening    The patient does NOT have a family history of known inherited conditions. This does NOT mean the patient and/or  their partner is not a carrier of a condition. Approximately 90% of couples at an increased reproductive risk for an inherited condition have no family history of that condition.     The patient has not had carrier screening previously.     The patient was not certain about whether to pursue carrier screening today. They can contact us if they would like to pursue screening. A brochure on carrier screening was provided.     RISK ASSESSMENT FOR CHROMOSOME CONDITIONS   We explained that the risk for fetal chromosome abnormalities increases with maternal age. We discussed specific features of common chromosome abnormalities, including trisomy 21 (Down syndrome), trisomy 13, trisomy 18, and sex chromosome trisomies.    At age 35 at midtrimester, the risk to have a baby with Down syndrome is 1 in 274.   At age 35 at midtrimester, the risk to have a baby with any chromosome abnormality is 1 in 135.     Inga has not had genetic screening in this pregnancy but elected to have screening today.      GENETIC TESTING OPTIONS   Genetic testing during a pregnancy includes screening and diagnostic procedures.      Screening tests are non-invasive which means no risk to the pregnancy and includes ultrasounds and blood work. The benefits and limitations of screening were reviewed. Screening tests provide a risk assessment (chance) specific to the pregnancy for certain fetal chromosome abnormalities but cannot definitively diagnose or exclude a fetal chromosome abnormality. Follow-up genetic counseling and consideration of diagnostic testing is recommended with any abnormal screening result. Diagnostic testing during a pregnancy is more certain and can test for more conditions. However, the tests do have a risk of miscarriage that requires careful consideration. These tests can detect fetal chromosome abnormalities with greater than 99% certainty. Results can be compromised by maternal cell contamination or mosaicism and are limited  by the resolution of current genetic testing technology.     There is no screening or diagnostic test that detects all forms of birth defects or intellectual disability.     We discussed the following screening options:   Non-invasive prenatal testing (NIPT)  Also called cell-free DNA screening because it detects chromosomes from the placenta in the pregnant person's blood  Can be done any time after 10 weeks gestation  Standard recommendation for NIPT screens for trisomy 21, trisomy 18, trisomy 13, with the option of adding sex chromosome aneuploidies, without or without predicted sex  Cannot screen for open neural tube defects, maternal serum AFP after 15 weeks is recommended  New NIPT options include screening for other trisomies, microdeletion syndromes, and in some cases fetal blood antigens. Guidelines do not recommend these conditions are included in standard screening. These options have limitations and should be discussed with a genetic counselor.   However, current (2023) ACMG guidelines do recommend that screening for one microdeletion syndrome, called 22q11.2 deletion syndrome be offered to all pregnant patients. 22q11.2 deletion syndrome has an estimated prevalence of 1 in 990 to 1 in 2148 (0.05-0.1%). Risk is not thought to increase with maternal age. Clinical features are variable but include congenital heart defects, cleft palate, developmental delays, immune system deficiencies, and hearing loss. Approximately 90% of cases are de marbin (a sporadic new change in a pregnancy). Cell-free DNA screening for 22q11.2 deletion syndrome is available with the inclusion of other microdeletion syndromes. There is less data about the performance of cell-free DNA screening for more rare microdeletions and the chance for false positives or negative may be increased.  We discussed the limitations of cell-free DNA screening in detecting microdeletions and the possiblity of false positives and false negatives. The  patient declined microdeletion syndrome screening.    We discussed the following ultrasound options:  Nuchal translucency (NT) ultrasound  Ultrasound between [id number] that includes nuchal translucency measurement and nasal bone assessments  Nuchal translucency refers to the space at the back of the neck where fluid builds up. All babies at this stage have fluid and there is only concern if there is too much fluid  Nasal bone refers to the small bone in the nose. There is concern for conditions like Down syndrome if the bone cannot be seen at all  This ultrasound can be done as part of first trimester screening, at the same time as another screen (NIPT), at the same time as a CVS, or if the patients does not want genetic screening.  Markers on ultrasound detects about 70% of pregnancies with aneuploidy  Abnormalities on NT ultrasound can also increase the risk for a birth defect, like a heart defect  Comprehensive level II ultrasound (Fetal Anatomy Ultrasound)  Ultrasound done between 18-20 weeks gestation  Screens for major birth defects and markers for aneuploidy (like trisomy 21 and trisomy 18)  Includes looking at the fetus/baby's growth, heart, organs (stomach, kidneys), placenta, and amniotic fluid    We discussed the following diagnostic options:   Chorionic villus sampling (CVS)  Invasive diagnostic procedure done between 10w0d and 13w6d  The procedure collects a small sample from the placenta for the purpose of chromosomal testing and/or other genetic testing  Diagnostic result; more than 99% sensitivity for fetal chromosome abnormalities  Cannot screen for open neural tube defects, maternal serum AFP after 15 weeks is recommended  Amniocentesis  Invasive diagnostic procedure done after 15 weeks gestation  The procedure collects a small sample of amniotic fluid for the purpose of chromosomal testing and/or other genetic testing  Diagnostic result; more than 99% sensitivity for fetal chromosome  abnormalities  Testing for AFP in the amniotic fluid can test for open neural tube defects    It was a pleasure to be involved with Inga pattie unger. Face-to-face time of the meeting was 30 minutes.    Larry Byers MS, [facility]  Board Certified and [place] Licensed Genetic Counselor  [facility]  Maternal Fetal Medicine  Office: [phone]  [facility]: [phone]   Fax: [phone]  [facility]

## 2024-09-11 NOTE — PROGRESS NOTES
Please see the imaging tab for details of the ultrasound performed today.    Jannie Smith MD  Specialist in Maternal-Fetal Medicine

## 2024-09-17 ENCOUNTER — TELEPHONE (OUTPATIENT)
Dept: MATERNAL FETAL MEDICINE | Facility: HOSPITAL | Age: 34
End: 2024-09-17
Payer: COMMERCIAL

## 2024-09-17 LAB — SCANNED LAB RESULT: NORMAL

## 2024-09-17 NOTE — TELEPHONE ENCOUNTER
September 17, 2024    Left a message for Inga regarding her Prequel (NIPT) results through [employer].     Results indicate NO ANEUPLOIDY DETECTED for chromosomes 21, 18, 13, or sex chromosomes (XX - female predicted fetal sex). Per patient request, predicted fetal sex was not disclosed.     This puts her current pregnancy at low risk for Down syndrome, trisomy 18, trisomy 13 and sex chromosome abnormalities. This test is reported to have the following sensitivities: Down syndrome: 99.7%, trisomy 18: 97.9%, trisomy 13: 99.0%, monosomy X: 95.8%, XX: 97.6%, and XY: 99.1%. Although these results are reassuring, this does not replace a standard chromosome analysis from a chorionic villus sampling or amniocentesis. The results reduce, but do not eliminate, the risk for the assessed conditions.    Level II ultrasound is recommended and scheduled with [facility] for 10/28/2024.    MSAFP is the appropriate second trimester screening test for open neural tube defects; the maternal quad screen is not recommended.    Her results are available in her Epic chart for her primary OB to review.    Larry Byers MS, [facility]  Board Certified and [place] Licensed Genetic Counselor  [facility]  Maternal Fetal Medicine  Office: [phone]  [facility]: [phone]   Fax: [phone]  [facility]

## 2024-09-19 ENCOUNTER — PRENATAL OFFICE VISIT (OUTPATIENT)
Dept: OBGYN | Facility: CLINIC | Age: 34
End: 2024-09-19
Payer: COMMERCIAL

## 2024-09-19 VITALS
BODY MASS INDEX: 24.5 KG/M2 | TEMPERATURE: 97.8 F | HEART RATE: 91 BPM | SYSTOLIC BLOOD PRESSURE: 118 MMHG | DIASTOLIC BLOOD PRESSURE: 63 MMHG | RESPIRATION RATE: 16 BRPM | OXYGEN SATURATION: 99 % | WEIGHT: 151.8 LBS

## 2024-09-19 DIAGNOSIS — Z34.90 NORMAL PREGNANCY, ANTEPARTUM: Primary | ICD-10-CM

## 2024-09-19 DIAGNOSIS — L50.9 HIVES: ICD-10-CM

## 2024-09-19 DIAGNOSIS — L29.9 PRURITIC DISORDER: ICD-10-CM

## 2024-09-19 PROCEDURE — 99214 OFFICE O/P EST MOD 30 MIN: CPT | Mod: 25 | Performed by: OBSTETRICS & GYNECOLOGY

## 2024-09-19 PROCEDURE — 99207 PR PRENATAL VISIT: CPT | Performed by: OBSTETRICS & GYNECOLOGY

## 2024-09-19 PROCEDURE — 80076 HEPATIC FUNCTION PANEL: CPT | Performed by: OBSTETRICS & GYNECOLOGY

## 2024-09-19 PROCEDURE — 82306 VITAMIN D 25 HYDROXY: CPT | Performed by: OBSTETRICS & GYNECOLOGY

## 2024-09-19 PROCEDURE — 36415 COLL VENOUS BLD VENIPUNCTURE: CPT | Performed by: OBSTETRICS & GYNECOLOGY

## 2024-09-19 PROCEDURE — 99000 SPECIMEN HANDLING OFFICE-LAB: CPT | Performed by: OBSTETRICS & GYNECOLOGY

## 2024-09-19 PROCEDURE — 82239 BILE ACIDS TOTAL: CPT | Mod: 90 | Performed by: OBSTETRICS & GYNECOLOGY

## 2024-09-19 RX ORDER — HYDROXYZINE PAMOATE 25 MG/1
25 CAPSULE ORAL 3 TIMES DAILY PRN
Qty: 60 CAPSULE | Refills: 1 | Status: SHIPPED | OUTPATIENT
Start: 2024-09-19

## 2024-09-19 ASSESSMENT — PATIENT HEALTH QUESTIONNAIRE - PHQ9
10. IF YOU CHECKED OFF ANY PROBLEMS, HOW DIFFICULT HAVE THESE PROBLEMS MADE IT FOR YOU TO DO YOUR WORK, TAKE CARE OF THINGS AT HOME, OR GET ALONG WITH OTHER PEOPLE: SOMEWHAT DIFFICULT
SUM OF ALL RESPONSES TO PHQ QUESTIONS 1-9: 3
SUM OF ALL RESPONSES TO PHQ QUESTIONS 1-9: 3

## 2024-09-19 NOTE — PROGRESS NOTES
12w5d  Main concern today is breaking out in hives and itching all over her body.  This is going on since just before 8 wks.  Thinks related to the pregnancy. Did not have this prior to pregnancy.   Was taking zyrtec but that is not really working anymore.   The itching is bothersome. Affecting sleep.   In the past she had hives when she was in middle school and then the hives spontaneously resolved after a few yrs. She has no allergies that she is aware of in terms of environmental, food, etc.     A/P   (Z34.90) Normal pregnancy, antepartum  (primary encounter diagnosis)  Comment:    Plan: Vitamin D Deficiency             (L50.9) Hives  Comment: unclear etiology but concern for relation to pregnancy  Plan: Vitamin D Deficiency, Adult Allergy/Asthma          Referral          (L29.9) Pruritic disorder  Comment:  will check LFT and bile acids  Plan: hydrOXYzine viry (VISTARIL) 25 MG capsule,         Hepatic panel (Albumin, ALT, AST, Bili, Alk         Phos, TP), Bile acids total      Answers submitted by the patient for this visit:  Patient Health Questionnaire (Submitted on 9/19/2024)  If you checked off any problems, how difficult have these problems made it for you to do your work, take care of things at home, or get along with other people?: Somewhat difficult  PHQ9 TOTAL SCORE: 3    Beti Tristan MD

## 2024-09-20 LAB
ALBUMIN SERPL BCG-MCNC: 3.7 G/DL (ref 3.5–5.2)
ALP SERPL-CCNC: 42 U/L (ref 40–150)
ALT SERPL W P-5'-P-CCNC: 21 U/L (ref 0–50)
AST SERPL W P-5'-P-CCNC: 21 U/L (ref 0–45)
BILIRUB DIRECT SERPL-MCNC: <0.2 MG/DL (ref 0–0.3)
BILIRUB SERPL-MCNC: 0.2 MG/DL
PROT SERPL-MCNC: 6.3 G/DL (ref 6.4–8.3)
VIT D+METAB SERPL-MCNC: 31 NG/ML (ref 20–50)

## 2024-09-22 LAB — BILE AC SERPL-SCNC: 5 UMOL/L

## 2024-09-26 ENCOUNTER — OFFICE VISIT (OUTPATIENT)
Dept: ALLERGY | Facility: CLINIC | Age: 34
End: 2024-09-26
Attending: OBSTETRICS & GYNECOLOGY
Payer: COMMERCIAL

## 2024-09-26 VITALS — OXYGEN SATURATION: 100 % | HEART RATE: 72 BPM | WEIGHT: 150.6 LBS | BODY MASS INDEX: 24.31 KG/M2

## 2024-09-26 DIAGNOSIS — L50.9 HIVES: ICD-10-CM

## 2024-09-26 LAB
ALBUMIN SERPL BCG-MCNC: 3.9 G/DL (ref 3.5–5.2)
ALP SERPL-CCNC: 43 U/L (ref 40–150)
ALT SERPL W P-5'-P-CCNC: 14 U/L (ref 0–50)
ANION GAP SERPL CALCULATED.3IONS-SCNC: 10 MMOL/L (ref 7–15)
AST SERPL W P-5'-P-CCNC: 17 U/L (ref 0–45)
BASOPHILS # BLD AUTO: 0 10E3/UL (ref 0–0.2)
BASOPHILS NFR BLD AUTO: 0 %
BILIRUB SERPL-MCNC: 0.3 MG/DL
BUN SERPL-MCNC: 8.9 MG/DL (ref 6–20)
CALCIUM SERPL-MCNC: 9.4 MG/DL (ref 8.8–10.4)
CHLORIDE SERPL-SCNC: 103 MMOL/L (ref 98–107)
CREAT SERPL-MCNC: 0.55 MG/DL (ref 0.51–0.95)
CRP SERPL-MCNC: 7.13 MG/L
EGFRCR SERPLBLD CKD-EPI 2021: >90 ML/MIN/1.73M2
EOSINOPHIL # BLD AUTO: 0 10E3/UL (ref 0–0.7)
EOSINOPHIL NFR BLD AUTO: 0 %
ERYTHROCYTE [DISTWIDTH] IN BLOOD BY AUTOMATED COUNT: 12.2 % (ref 10–15)
ERYTHROCYTE [SEDIMENTATION RATE] IN BLOOD BY WESTERGREN METHOD: 21 MM/HR (ref 0–20)
GLUCOSE SERPL-MCNC: 86 MG/DL (ref 70–99)
HCO3 SERPL-SCNC: 22 MMOL/L (ref 22–29)
HCT VFR BLD AUTO: 36.2 % (ref 35–47)
HGB BLD-MCNC: 12.6 G/DL (ref 11.7–15.7)
IMM GRANULOCYTES # BLD: 0.1 10E3/UL
IMM GRANULOCYTES NFR BLD: 1 %
LYMPHOCYTES # BLD AUTO: 2.8 10E3/UL (ref 0.8–5.3)
LYMPHOCYTES NFR BLD AUTO: 26 %
MCH RBC QN AUTO: 32.2 PG (ref 26.5–33)
MCHC RBC AUTO-ENTMCNC: 34.8 G/DL (ref 31.5–36.5)
MCV RBC AUTO: 93 FL (ref 78–100)
MONOCYTES # BLD AUTO: 0.6 10E3/UL (ref 0–1.3)
MONOCYTES NFR BLD AUTO: 5 %
NEUTROPHILS # BLD AUTO: 7.4 10E3/UL (ref 1.6–8.3)
NEUTROPHILS NFR BLD AUTO: 67 %
PLATELET # BLD AUTO: 308 10E3/UL (ref 150–450)
POTASSIUM SERPL-SCNC: 4.3 MMOL/L (ref 3.4–5.3)
PROT SERPL-MCNC: 6.8 G/DL (ref 6.4–8.3)
RBC # BLD AUTO: 3.91 10E6/UL (ref 3.8–5.2)
SODIUM SERPL-SCNC: 135 MMOL/L (ref 135–145)
TSH SERPL DL<=0.005 MIU/L-ACNC: 0.53 UIU/ML (ref 0.3–4.2)
WBC # BLD AUTO: 10.9 10E3/UL (ref 4–11)

## 2024-09-26 PROCEDURE — 85652 RBC SED RATE AUTOMATED: CPT

## 2024-09-26 PROCEDURE — 80053 COMPREHEN METABOLIC PANEL: CPT

## 2024-09-26 PROCEDURE — 86800 THYROGLOBULIN ANTIBODY: CPT

## 2024-09-26 PROCEDURE — 86376 MICROSOMAL ANTIBODY EACH: CPT

## 2024-09-26 PROCEDURE — 84443 ASSAY THYROID STIM HORMONE: CPT

## 2024-09-26 PROCEDURE — 99204 OFFICE O/P NEW MOD 45 MIN: CPT

## 2024-09-26 PROCEDURE — 85025 COMPLETE CBC W/AUTO DIFF WBC: CPT

## 2024-09-26 PROCEDURE — 86140 C-REACTIVE PROTEIN: CPT

## 2024-09-26 NOTE — PROGRESS NOTES
Inga Miner was seen in the Allergy Clinic at [facility].    Inga Miner is a 34 year old female  being seen today for ongoing evaluation of hives.  Referral from,   Beti Tristan MD [facility] RD OB/GYN       AMA: GS and level 2: (forgot to discuss asa, rec next visit)  Chronic hives of unknown cause, hasn't had since teenager, started again around 7-8 weeks    12w5d  Main concern today is breaking out in hives and itching all over her body.  This is going on since just before 8 wks.  Thinks related to the pregnancy. Did not have this prior to pregnancy.   Was taking zyrtec but that is not really working anymore.   The itching is bothersome. Affecting sleep.   In the past she had hives when she was in middle school and then the hives spontaneously resolved after a few yrs. She has no allergies that she is aware of in terms of environmental, food, etc.     (L50.9) Hives  Comment: unclear etiology but concern for relation to pregnancy  Plan: Vitamin D Deficiency, Adult Allergy/Asthma          Referral     Mid August about 7 weeks pregnant     Daily hives now - 2 weeks     History of Present Illness:     Patient reports a past medical history of intermittent hives when she was in 6th to eighth grade.  She reports they happen approximately every 2 weeks, and would evolve over the course of a few days.  She reports a resolved spontaneously.  She did see an allergist for her diagnosis, without specific cause for her intermittent hives.    Zyrtec 2 tablets, daily, helped with some itching, 2-3 weeks with some relief     Benadryl not sig relief     Hydroxyzine, 3 nights, not helping her sleep, bad pregnancy     Onset: , patient reports they began when she was approximately 7 weeks pregnant.  Patient reports she has been pregnant 1 other time, spontaneous  occurred at approximately 6 weeks, without any hives.  Any illness, stressors or vaccines  prior to onset: Denies additional stressors beyond pregnancy.  Duration: She reports initially she was having mild episodes of urticaria, however they have been getting worse, and now has daily hives.  The hives will spread and evolve throughout her body from day today.  Location: They have been everywhere throughout her entire body  Aggravating factors: exercise, pressure prone to longer clothing, hot climicate that she was exercising in.  Denies aggravating factors of stress, lack of sleep, spicy foods.   Do they wake up in the morning with hives: yes   Family history of chronic urticaria/angioedema: Denies  Autoimmune diease or thyroid dysfunction: Family members with history of RA and vitiligo   Unintended weight loss: no   New/Changes of Medications: Patient reports early in pregnancy which she was taking progesterone, she has stopped taking this medication and continues to have hives.  She reports she was taking this medication approximately 2 weeks prior to onset of her hives.  NSAID/ASA use: Denies use  Dermatology providers or skin biopsy: no     The patient describes hives as pruritic, circumscribed, raised, erythematous plaques, often with central pallor. She denies unintentional weight loss, recurrent fevers, or arthralgias. The skin lesions do not last on the same spot for more than 24 hours. They do not leave any bruising or discoloration.  Patient reports she has had some bruising when she is itching her hives with intensity.  However does not have any bruising outside of itching.  The patient also denies having difficulty breathing, or swelling/tingling sensation of her throat/lips or tongue, vomiting or diarrhea at that time.  The smallest one is approximately pencil eraser in size, and the biggest one was as approximately half dollar in size.  Patient has had significant hives that do coalesce with 1 another. She  has a picture on her cell phone with urticaria on her abdominal wall, arm, back,  buttock, chest, face, leg, knee, neck, scalp, shoulder, thigh, and wrist.    They have tried Zyrtec 2 tablets -this was initially helping mildly, now she reports no improvement with this regimen.   Patient reports she tried 5 mg of prednisone that she had from a past diagnosis, she did have mild improvement of her symptoms.  However once she stopped taking this medication her hives became more significant than in the past.   She has tried taking Benadryl, without any improvement.  25 mg hydroxyzine with bedtime, she reports no significant improvement of her sleeping/itching, and had stomach upset with this medication.  Patient denies experienced angioedema.      On a thorough history, I was unable to find any specific contact, food, airborne, or medication reaction that would be associated with her hives.  Denies any new animals in the home, any new significant exposures, any new products/foods in her routine.    Past Medical History:   Diagnosis Date    Anxiety 2/7/2024    Varicella        Past Surgical History:   Procedure Laterality Date    NO HISTORY OF SURGERY           Current Outpatient Medications:     citalopram (CELEXA) 20 MG tablet, Take 1 tablet (20 mg) by mouth daily, Disp: 90 tablet, Rfl: 1    hydrOXYzine viry (VISTARIL) 25 MG capsule, Take 1 capsule (25 mg) by mouth 3 times daily as needed for itching., Disp: 60 capsule, Rfl: 1    Prenatal Vit-DSS-Fe Cbn-FA (PRENATAL AD PO), , Disp: , Rfl:   Allergies   Allergen Reactions    Clindamycin Rash         Family History   Problem Relation Age of Onset    Rheumatoid Arthritis Mother     Diabetes Father     Diabetes Sister     Breast Cancer Cousin 31       EXAM:   LMP 06/22/2024     Physical Exam  Skin:     Comments: Patient has skin findings consistent with urticaria, dime sized to Eduin in size located on her back, abdomen, chest, and buttock         WORKUP: Comprehensive metabolic panel     CRP inflammation     CBC with Platelets & Differential     ESR:  Erythrocyte sedimentation rate     TSH     Anti thyroglobulin antibody     Thyroid peroxidase antibody     ASSESSMENT/PLAN:  Inga Miner is a 34 year old female with intermittent/chronic urticaria.    Intermittent/chronic urticaria    Patient was particularly miserable during today's appointment, as she has not had a break from hives for the last 2 weeks.  With cetirizine 2 tablets daily, and Benadryl, hydroxyzine she has had little relief.  We did discuss that pregnancy is a potential trigger to hives.     Skin findings and HPI that were identified today are consistent with diagnosis of intermittent/chronic urticaria.    Cetrizine (Zyrtec) 1-2 tablets in the morning, 1-2 tablets in the evening.  A maximum of 4 tablets daily can be used.  Please use the least amount of antihistamines possible to control hives/breakthrough hives.    Famotidine (Pepcid) 20 mg, 1 tablet in the morning, 1 tablet in the evening.      See screening labs listed above for further evaluation of chronic urticaria, we will be in touch with [facility] about these results.    We discussed today that if patient fails initial treatment plan, I would want her to follow-up with an MD allergist for further evaluation/treatment plan/Biologics.  Patient was agreeable to trying above listed treatment plan.     Differential diagnosis includes PUPP, however patient does not describe her hives to be particularly bothersome some throughout her abdomen/along the potential lines of her stretching skin.  Less likely this diagnosis is patient has a past medical history of intermittent urticaria.  Less likely as patient has experienced the symptoms beginning around 7 weeks of pregnancy.  Rash is also last less than 24 hours, and move throughout patient's entire body.    Patient reports she will lives in [place], would like to follow-up at the [facility], or [facility].    Follow-up with this provider as needed.  Please follow-up with  Dr. Estrada, or Dr. Sylvester if your symptoms are not improving with the above listed treatment plan.    Thank you for allowing me to participate in the care of Inga Gregorio Miner.    I spent 46 minutes on the date of the encounter doing chart review, history and exam, documentation and further coordination as noted above exclusive of separately reported interpretations.     Please note that this note consists of symbols derived from keyboarding, dictation and/or voice recognition software. As a result, there may be errors in the script that have gone undetected. Please consider this when interpreting information found in this chart.     Tamiko Schmid PA-C  [facility]

## 2024-09-26 NOTE — LETTER
9/26/2024      Inga Miner  [address] [place] [facility] [address]      Dear Colleague,    Thank you for referring your patient, Inga Miner, to the [facility]. Please see a copy of my visit note below.    Inga Miner was seen in the Allergy Clinic at [facility].    Inga Miner is a 34 year old female  being seen today for ongoing evaluation of hives.  Referral from,   Beti Tristan MD [facility] RD OB/GYN       AMA: GS and level 2: (forgot to discuss asa, rec next visit)  Chronic hives of unknown cause, hasn't had since teenager, started again around 7-8 weeks    12w5d  Main concern today is breaking out in hives and itching all over her body.  This is going on since just before 8 wks.  Thinks related to the pregnancy. Did not have this prior to pregnancy.   Was taking zyrtec but that is not really working anymore.   The itching is bothersome. Affecting sleep.   In the past she had hives when she was in middle school and then the hives spontaneously resolved after a few yrs. She has no allergies that she is aware of in terms of environmental, food, etc.     (L50.9) Hives  Comment: unclear etiology but concern for relation to pregnancy  Plan: Vitamin D Deficiency, Adult Allergy/Asthma          Referral     Mid August about 7 weeks pregnant     Daily hives now - 2 weeks     History of Present Illness:     Patient reports a past medical history of intermittent hives when she was in 6th to eighth grade.  She reports they happen approximately every 2 weeks, and would evolve over the course of a few days.  She reports a resolved spontaneously.  She did see an allergist for her diagnosis, without specific cause for her intermittent hives.    Zyrtec 2 tablets, daily, helped with some itching, 2-3 weeks with some relief     Benadryl not sig relief     Hydroxyzine, 3 nights, not helping her sleep, bad pregnancy     Onset: Mid August,  patient reports they began when she was approximately 7 weeks pregnant.  Patient reports she has been pregnant 1 other time, spontaneous  occurred at approximately 6 weeks, without any hives.  Any illness, stressors or vaccines prior to onset: Denies additional stressors beyond pregnancy.  Duration: She reports initially she was having mild episodes of urticaria, however they have been getting worse, and now has daily hives.  The hives will spread and evolve throughout her body from day today.  Location: They have been everywhere throughout her entire body  Aggravating factors: exercise, pressure prone to longer clothing, hot climicate that she was exercising in.  Denies aggravating factors of stress, lack of sleep, spicy foods.   Do they wake up in the morning with hives: yes   Family history of chronic urticaria/angioedema: Denies  Autoimmune diease or thyroid dysfunction: Family members with history of RA and vitiligo   Unintended weight loss: no   New/Changes of Medications: Patient reports early in pregnancy which she was taking progesterone, she has stopped taking this medication and continues to have hives.  She reports she was taking this medication approximately 2 weeks prior to onset of her hives.  NSAID/ASA use: Denies use  Dermatology providers or skin biopsy: no     The patient describes hives as pruritic, circumscribed, raised, erythematous plaques, often with central pallor. She denies unintentional weight loss, recurrent fevers, or arthralgias. The skin lesions do not last on the same spot for more than 24 hours. They do not leave any bruising or discoloration.  Patient reports she has had some bruising when she is itching her hives with intensity.  However does not have any bruising outside of itching.  The patient also denies having difficulty breathing, or swelling/tingling sensation of her throat/lips or tongue, vomiting or diarrhea at that time.  The smallest one is approximately pencil  eraser in size, and the biggest one was as approximately half dollar in size.  Patient has had significant hives that do coalesce with 1 another. She  has a picture on her cell phone with urticaria on her abdominal wall, arm, back, buttock, chest, face, leg, knee, neck, scalp, shoulder, thigh, and wrist.    They have tried Zyrtec 2 tablets -this was initially helping mildly, now she reports no improvement with this regimen.   Patient reports she tried 5 mg of prednisone that she had from a past diagnosis, she did have mild improvement of her symptoms.  However once she stopped taking this medication her hives became more significant than in the past.   She has tried taking Benadryl, without any improvement.  25 mg hydroxyzine with bedtime, she reports no significant improvement of her sleeping/itching, and had stomach upset with this medication.  Patient denies experienced angioedema.      On a thorough history, I was unable to find any specific contact, food, airborne, or medication reaction that would be associated with her hives.  Denies any new animals in the home, any new significant exposures, any new products/foods in her routine.    Past Medical History:   Diagnosis Date     Anxiety 2/7/2024     Varicella        Past Surgical History:   Procedure Laterality Date     NO HISTORY OF SURGERY           Current Outpatient Medications:      citalopram (CELEXA) 20 MG tablet, Take 1 tablet (20 mg) by mouth daily, Disp: 90 tablet, Rfl: 1     hydrOXYzine viry (VISTARIL) 25 MG capsule, Take 1 capsule (25 mg) by mouth 3 times daily as needed for itching., Disp: 60 capsule, Rfl: 1     Prenatal Vit-DSS-Fe Cbn-FA (PRENATAL AD PO), , Disp: , Rfl:   Allergies   Allergen Reactions     Clindamycin Rash         Family History   Problem Relation Age of Onset     Rheumatoid Arthritis Mother      Diabetes Father      Diabetes Sister      Breast Cancer Cousin 31       EXAM:   LMP 06/22/2024     Physical Exam  Skin:     Comments:  Patient has skin findings consistent with urticaria, dime sized to Eduin in size located on her back, abdomen, chest, and buttock         WORKUP: Comprehensive metabolic panel     CRP inflammation     CBC with Platelets & Differential     ESR: Erythrocyte sedimentation rate     TSH     Anti thyroglobulin antibody     Thyroid peroxidase antibody     ASSESSMENT/PLAN:  Inga Miner is a 34 year old female with intermittent/chronic urticaria.    Intermittent/chronic urticaria    Patient was particularly miserable during today's appointment, as she has not had a break from hives for the last 2 weeks.  With cetirizine 2 tablets daily, and Benadryl, hydroxyzine she has had little relief.  We did discuss that pregnancy is a potential trigger to hives.     Skin findings and HPI that were identified today are consistent with diagnosis of intermittent/chronic urticaria.    Cetrizine (Zyrtec) 1-2 tablets in the morning, 1-2 tablets in the evening.  A maximum of 4 tablets daily can be used.  Please use the least amount of antihistamines possible to control hives/breakthrough hives.    Famotidine (Pepcid) 20 mg, 1 tablet in the morning, 1 tablet in the evening.      See screening labs listed above for further evaluation of chronic urticaria, we will be in touch with [facility] about these results.    We discussed today that if patient fails initial treatment plan, I would want her to follow-up with an MD allergist for further evaluation/treatment plan/Biologics.  Patient was agreeable to trying above listed treatment plan.     Differential diagnosis includes PUPP, however patient does not describe her hives to be particularly bothersome some throughout her abdomen/along the potential lines of her stretching skin.  Less likely this diagnosis is patient has a past medical history of intermittent urticaria.  Less likely as patient has experienced the symptoms beginning around 7 weeks of pregnancy.  Rash is also last less than 24  hours, and move throughout patient's entire body.    Patient reports she will lives in [place], would like to [facility]up at the [facility], or [facility].    Follow-up with this provider as needed.  Please follow-up with Dr. Estrada, or Dr. Sylvester if your symptoms are not improving with the above listed treatment plan.    Thank you for allowing me to participate in the care of Inga Miner.    I spent 46 minutes on the date of the encounter doing chart review, history and exam, documentation and further coordination as noted above exclusive of separately reported interpretations.     Please note that this note consists of symbols derived from keyboarding, dictation and/or voice recognition software. As a result, there may be errors in the script that have gone undetected. Please consider this when interpreting information found in this chart.     Tamiko Schmid PA-C  [facility]      Again, thank you for allowing me to participate in the care of your patient.        Sincerely,        Tamiko Schmid PA-C

## 2024-09-26 NOTE — PATIENT INSTRUCTIONS
Cetrizine (Zyrtec) 1-2 tablets in the morning, 1-2 tablets in the evening.  A maximum of 4 tablets daily can be used.  Please use the least amount of antihistamines possible to control hives/breakthrough hives.    Famotidine (Pepcid) 20 mg, 1 tablet in the morning, 1 tablet in the evening.

## 2024-09-27 LAB
THYROGLOB AB SERPL IA-ACNC: <20 IU/ML
THYROPEROXIDASE AB SERPL-ACNC: <10 IU/ML

## 2024-09-29 DIAGNOSIS — O09.512 ELDERLY PRIMIGRAVIDA IN SECOND TRIMESTER: Primary | ICD-10-CM

## 2024-09-29 DIAGNOSIS — L50.9 URTICARIA: ICD-10-CM

## 2024-09-30 DIAGNOSIS — O09.529 AMA (ADVANCED MATERNAL AGE) MULTIGRAVIDA 35+: ICD-10-CM

## 2024-09-30 DIAGNOSIS — L29.9 PRURITIC DISORDER: Primary | ICD-10-CM

## 2024-09-30 DIAGNOSIS — O99.711 PRURITUS OF PREGNANCY IN FIRST TRIMESTER: ICD-10-CM

## 2024-09-30 DIAGNOSIS — L29.9 PRURITUS OF PREGNANCY IN FIRST TRIMESTER: ICD-10-CM

## 2024-10-14 ENCOUNTER — PRENATAL OFFICE VISIT (OUTPATIENT)
Dept: OBGYN | Facility: CLINIC | Age: 34
End: 2024-10-14
Payer: COMMERCIAL

## 2024-10-14 VITALS
OXYGEN SATURATION: 100 % | WEIGHT: 154 LBS | HEART RATE: 76 BPM | SYSTOLIC BLOOD PRESSURE: 118 MMHG | DIASTOLIC BLOOD PRESSURE: 72 MMHG | BODY MASS INDEX: 24.86 KG/M2

## 2024-10-14 DIAGNOSIS — Z23 NEED FOR PROPHYLACTIC VACCINATION AND INOCULATION AGAINST INFLUENZA: Primary | ICD-10-CM

## 2024-10-14 PROCEDURE — 90471 IMMUNIZATION ADMIN: CPT

## 2024-10-14 PROCEDURE — 99207 PR PRENATAL VISIT: CPT

## 2024-10-14 PROCEDURE — 90656 IIV3 VACC NO PRSV 0.5 ML IM: CPT

## 2024-10-14 NOTE — PROGRESS NOTES
16.2 wks   Here for timothy  Not feeling fm yet  No ctx bleeding or LOF  Hives have been less- saw an allergist seeing another specialty allergist, continuing zyrtec, and oatmeal baths, has been really uncomfortable and interruptive to sleep. Did not like the way vistaril made her feel.  -discussed window of MSAFP 15-20 wks- will wait until her FAS and decide next visit if she prefers to draw  -discussed recommendation of starting ASA supplement now once daily for duration of pregnancy for hdp   -lvl 2 FAS ama 10/29  Rtc 4 wks  ANAY Mcnair CNP

## 2024-10-28 NOTE — PROGRESS NOTES
[facility]  [address] [place] [address] [address], [place] [address]  Main: [phone], Fax: [phone]       Referring Provider:  Kun ELY     Inga Miner is a 34 year old  at 18w3d by LMP consistent with 8w1d US here for MFM consultation regarding chronic urticaria.    Inga has a history of chronic idiopathic hives. She started to have an outbreak around 7-8 weeks of pregnancy, and had them daily until about 2-3 weeks ago. Prior to this episode, she last had an outbreak in high school, with her first episode being in 6th grade. She would have intermittent outbreaks every 2 weeks, which would spontaneously resolves over a few days time. She saw an allergist, but no known cause was found on allergy skin testing. She is following with Allergy/Immunology (Dr. Yolanda Sylvester), and is currently taking zyrtec and famotidine, which has resolved her hives at this point. Her next visit with them is 24. With her outbreaks this pregnancy she has tried Vistaril, but did not like the way it made her feel, and benadryl, but it was not helpful in improving her symptoms. Oatmeal baths were somewhat helpful. She also tried a short course of prednisone that helped her pruritus some, but her symptoms returned to baseline or worse when she stopped the steroids. She denies any type of facial swelling, difficulty breathing, or tingling when she has hives. The rash does move throughout her body and the hives affect a different area day-by-day. Most all areas have been effected. She does not have an epipen. No trigger has been identified. She had a negative work-up for intrahepatic cholestasis of pregnancy, which was completed due to her pruritus.     Inga has a history of an anembryonic pregnancy with medication treatment at 6 weeks in her G1.     Today, she denies contractions, leakage of fluid, change in vaginal discharge, or vaginal bleeding. She is not yet feeling regular fetal movement.   She states she does not have hives today, and that it has been a couple weeks since her last outbreak. She will notice some forming if she forgets to take her zyrtec/famotidine.     Prenatal Care:  Primary OB care this pregnancy has been with Dr. Tristan from [facility].     OB History    Para Term  AB Living   2 0 0 0 1 0   SAB IAB Ectopic Multiple Live Births   1 0 0 0 0      # Outcome Date GA Lbr Chaparro/2nd Weight Sex Type Anes PTL Lv   2 Current            1 SAB 23 6w0d    SAB         Birth Comments: anembryonic pregnancy, took mife/miso       Gynecologic History   - Denies any history of abnormal pap smears (NILM 12/15/22)   - Denies prior cervical surgery or procedures  - Denies any history of frequent UTIs, vaginal infections, or STIs    Past Medical History     Past Medical History:   Diagnosis Date    Anxiety 2024    Varicella        Past Surgical History     Past Surgical History:   Procedure Laterality Date    NO HISTORY OF SURGERY         Medication List     Prior to Admission medications    Medication Sig Last Dose Taking? Auth Provider Long Term End Date   Cetirizine HCl (ZYRTEC PO) Take 1 tablet by mouth daily.  Yes Reported, Patient     citalopram (CELEXA) 20 MG tablet Take 1 tablet (20 mg) by mouth daily  Yes Camilla Zepeda PA-C Yes    MAGNESIUM PO Take 1 tablet by mouth daily.  Yes Reported, Patient     Prenatal Vit-DSS-Fe Cbn-FA (PRENATAL AD PO)   Yes Reported, Patient         Allergies   Clindamycin    Social History   Denies use of alcohol, drugs or smoking.    Family History   Please see genetic counseling note for detailed 3-generation family history  Family history insignificant for genetic conditions    Specifically, denies family history of motor/intellectual impairment, genetic or chromosome abnormalities or congenital anomalies.      Review of Systems   10-point ROS negative except as in HPI.    Physical Exam   /67 (BP  "Location: Right arm, Patient Position: Sitting, Cuff Size: Adult Regular)   Pulse 83   LMP 2024   SpO2 100%     Gen: NAD  CV: Regular rate   Resp: Breathing comfortably on room air   Abd: Gravid, non-tender  Ext: No edema    Labs   No results found for this or any previous visit (from the past 720 hours).       Ultrasound   See today's ultrasound report under the \"imaging\" tab.    Other Pertinent Evaluation     Patient reports negative allergen skin testing as a child        Assessment/Counseling     Inga Miner is a 34 year old  at 18w3d by LMP consistent with 8w1d US here for MFM consultation regarding chronic idiopathic hives.    Chronic Idiopathic Urticaria  - Chronic urticaria is driven by mast-cell activation, causing chronic inflammatory diease. Chronic urticaria effects mostly females of reproductive age, and is therefore oftentimes seen during gestation. Sex hormones can effect the mast cell biology, and the increase of these hormones during pregnancy can change the course of mast cell activation, and therefore the course of chronic urticaria. There is little data to support pregnancy outcomes with chronic urticaria. A recent study shows that chronic urticaria improves in about half of patients during pregnancy, and worsens in one third. Approximately, 2 out of 5 patients will have a flare during pregnancy, more likely to occur in the first or third trimesters. Pregnancy outcomes such as  birth were similar for patients not effected with chronic urticaria.   - Treatment for Urticaria is based on histamine blockade. It is recommended to start a H1 antihistamine and H2 antihistamine as baseline therapy. If the urticaria are refractory to these, it is reasonable to add hydroxyzine or benadryl and to consider Omalizumab, a recombinant IgG1 anti-IgE monoclonal antibody. A study that looked at Omalizumab use in asthma patients during pregnancy found no difference in congenital anomalies, " live births, or premature births in those taking Omalizumab and in those taking conventional treatment. This IgG1 monoclonal antibody does cross the placenta and had a long half-life (26 days), with the lowest fetal exposure during the first trimester and the higher placental transfer during the third trimester. Corticosteroids are also an acceptable therapy in refractory cases of chronic urticaria to provide the patient temporary relief, but this effect will likely subside once the steroids are discontinued, and are not meant for long-term use throughout the pregnancy if other options are available.   - For Inga, no known cause has been found. Due to Inga having these periodically since childhood, it is likely these are caused by an increase in sex hormones during pregnancy. She does not have any angioedema or anaphylaxis symptoms, but there is a concern that these could develop at any time. In a recent study, 17.4% of patients with chronic urticaria reported new or recurrent angioedema during pregnancy. It would be reasonable for Inga to be prescribed an epinephrine pen to carry in case of new onset anaphylaxis/angioedema.     Reference: Amy BRADY, Denise I, Jr AC, Francis SALDANA, Rachel D, Lissett BRADLEY, Jase WESLEY. Urticaria in Pregnancy and Lactation. Front Allergy. 2022 Jul 7;3:680625. doi: 10.3389/falgy.2022.942652. PMID: 28600407; PMCID: [id number].    Recommendations     Genetic screening  - Met with genetic counseling (see separate note for full details)  - Had LR NIPT and normal nuchal translucency ultrasound for genetic screening  - Inga was also counseled on carrier screening and will call if she decides to proceed with this     Medications  - Continue Zyrtec and Famotidine BID to control urticaria. Increase dosing as needed (can increase up to four-fold baseline dose)   - PNV  - Just started ASA 81 mg, encouraged to continue until delivery   - Can add Benadryl or Hydroxyzine for continued  symptoms   - Refer/continuing to follow with Allergy-Immunology. If they feel it is reasonable, can consider starting Omalizumab in antihistamine resistant patients   - It is reasonable to prescribe the patient an epinephrine pen to carry in the rare occurrence that angioedema/anaphylaxis occurs     Laboratory evaluation  - per trimester indication  - Re-draw bile acids (total) and LFTs if concern for ICP occurs later in pregnancy (especially if pruritus on soles/palms without rash/hives present)     Maternal antepartum management  - Continue Zyrtec, Famotidine and other medical therapy as recommended per Allergy/Immunology  - Okay for corticosteroid treatment as needed to control her hives/pruritus symptoms for a short term period  - If she requires longer term corticosteroid therapy, consider stress dose steroids at the time of delivery     Ultrasound surveillance  - Anatomy ultrasound completed today and was wnl, please see full report    - Further ultrasounds as clinically indicated.    Timing and mode of delivery  - No contraindication to vaginal delivery    Postpartum management   - Routine   - Close monitoring to assess for worsening of urticaria       The patient was seen and evaluated with Dr. Bernarda Maurice.    At the end of our discussion, Ms. Miner indicated that her questions were answered and she seemed satisfied with our discussion.  Thank you for allowing us to participate in the care of your patient. Please do not hesitate to contact us if you have further questions regarding the management of your patient.     Sarah Bryan MD   Maternal Fetal Medicine Fellow         ----------                                                                                                          MFM Physician Attestation  I saw this patient with the fellow and agree with the their findings and plan of care as documented in the note.  I personally reviewed her vital signs, medications, labs, pertinent imaging, and  fetal monitoring.    I personally spent a total of 75 minutes, including both face-to-face and non-face-to-face on the date of the encounter, addressing the above diagnosis. Activities performed in this time include chart review, obtaining / reviewing history, performing a medically necessary evaluation, documentation and counseling/care coordination/ordering tests/ordering meds.      Bernarda Maurice MD  Maternal Fetal Medicine   Date of Service (when I saw the patient): 10/29/2024

## 2024-10-29 ENCOUNTER — HOSPITAL ENCOUNTER (OUTPATIENT)
Dept: ULTRASOUND IMAGING | Facility: CLINIC | Age: 34
Discharge: HOME | End: 2024-10-29
Attending: STUDENT IN AN ORGANIZED HEALTH CARE EDUCATION/TRAINING PROGRAM
Payer: COMMERCIAL

## 2024-10-29 ENCOUNTER — OFFICE VISIT (OUTPATIENT)
Dept: MATERNAL FETAL MEDICINE | Facility: CLINIC | Age: 34
End: 2024-10-29
Attending: STUDENT IN AN ORGANIZED HEALTH CARE EDUCATION/TRAINING PROGRAM
Payer: COMMERCIAL

## 2024-10-29 VITALS — DIASTOLIC BLOOD PRESSURE: 67 MMHG | SYSTOLIC BLOOD PRESSURE: 107 MMHG | OXYGEN SATURATION: 100 % | HEART RATE: 83 BPM

## 2024-10-29 DIAGNOSIS — O26.90 PREGNANCY RELATED CONDITION, ANTEPARTUM: ICD-10-CM

## 2024-10-29 DIAGNOSIS — O99.711 PRURITUS OF PREGNANCY IN FIRST TRIMESTER: ICD-10-CM

## 2024-10-29 DIAGNOSIS — L29.9 PRURITUS OF PREGNANCY IN FIRST TRIMESTER: ICD-10-CM

## 2024-10-29 PROCEDURE — 76811 OB US DETAILED SNGL FETUS: CPT | Mod: 26 | Performed by: STUDENT IN AN ORGANIZED HEALTH CARE EDUCATION/TRAINING PROGRAM

## 2024-10-29 PROCEDURE — 99215 OFFICE O/P EST HI 40 MIN: CPT | Mod: 25 | Performed by: STUDENT IN AN ORGANIZED HEALTH CARE EDUCATION/TRAINING PROGRAM

## 2024-10-29 PROCEDURE — 99417 PROLNG OP E/M EACH 15 MIN: CPT | Mod: 25 | Performed by: STUDENT IN AN ORGANIZED HEALTH CARE EDUCATION/TRAINING PROGRAM

## 2024-10-29 PROCEDURE — 76811 OB US DETAILED SNGL FETUS: CPT

## 2024-10-29 PROCEDURE — 99214 OFFICE O/P EST MOD 30 MIN: CPT | Mod: 25 | Performed by: STUDENT IN AN ORGANIZED HEALTH CARE EDUCATION/TRAINING PROGRAM

## 2024-10-29 NOTE — NURSING NOTE
Inga and Larry here for MFM L2/consult at 18w3d related to history of AMA, pruitis in pregnancy. No FM yet, denies LOF, VB, and CTX. Medications and allergies reviewed. Dr. LORI Maurice and Dr. Bryan met with patient to discuss plan, see consult note. Patient discharged stable and ambulatory.

## 2024-11-11 ENCOUNTER — PRENATAL OFFICE VISIT (OUTPATIENT)
Dept: OBGYN | Facility: CLINIC | Age: 34
End: 2024-11-11
Payer: COMMERCIAL

## 2024-11-11 VITALS
TEMPERATURE: 98 F | HEART RATE: 84 BPM | BODY MASS INDEX: 25.5 KG/M2 | DIASTOLIC BLOOD PRESSURE: 67 MMHG | SYSTOLIC BLOOD PRESSURE: 115 MMHG | OXYGEN SATURATION: 100 % | WEIGHT: 158 LBS

## 2024-11-11 DIAGNOSIS — O09.512 ELDERLY PRIMIGRAVIDA IN SECOND TRIMESTER: Primary | ICD-10-CM

## 2024-11-11 PROCEDURE — 99207 PR PRENATAL VISIT: CPT | Performed by: OBSTETRICS & GYNECOLOGY

## 2024-11-11 NOTE — PROGRESS NOTES
20w2d   Feeling well.  More energy and less hunger. Feeling some movement.   Hives and itching have improved.  Stopped zyrtec about 5 days ago.    Reviewed normal L2 US.  discussed GCT at [place].  RR

## 2024-12-12 ENCOUNTER — PRENATAL OFFICE VISIT (OUTPATIENT)
Dept: OBGYN | Facility: CLINIC | Age: 34
End: 2024-12-12
Payer: COMMERCIAL

## 2024-12-12 VITALS
HEART RATE: 89 BPM | BODY MASS INDEX: 26.47 KG/M2 | OXYGEN SATURATION: 100 % | SYSTOLIC BLOOD PRESSURE: 118 MMHG | DIASTOLIC BLOOD PRESSURE: 68 MMHG | WEIGHT: 164 LBS

## 2024-12-12 DIAGNOSIS — O09.512 ELDERLY PRIMIGRAVIDA IN SECOND TRIMESTER: Primary | ICD-10-CM

## 2024-12-12 LAB
ERYTHROCYTE [DISTWIDTH] IN BLOOD BY AUTOMATED COUNT: 12.9 % (ref 10–15)
FERRITIN SERPL-MCNC: 25 NG/ML (ref 6–175)
GLUCOSE 1H P 50 G GLC PO SERPL-MCNC: 57 MG/DL (ref 70–129)
HCT VFR BLD AUTO: 35 % (ref 35–47)
HGB BLD-MCNC: 11.9 G/DL (ref 11.7–15.7)
HOLD SPECIMEN: NORMAL
IRON BINDING CAPACITY (ROCHE): 358 UG/DL (ref 240–430)
IRON SATN MFR SERPL: 41 % (ref 15–46)
IRON SERPL-MCNC: 145 UG/DL (ref 37–145)
MCH RBC QN AUTO: 32.9 PG (ref 26.5–33)
MCHC RBC AUTO-ENTMCNC: 34 G/DL (ref 31.5–36.5)
MCV RBC AUTO: 97 FL (ref 78–100)
PLATELET # BLD AUTO: 230 10E3/UL (ref 150–450)
RBC # BLD AUTO: 3.62 10E6/UL (ref 3.8–5.2)
T PALLIDUM AB SER QL: NONREACTIVE
WBC # BLD AUTO: 9.3 10E3/UL (ref 4–11)

## 2024-12-12 NOTE — PROGRESS NOTES
GCT today. Having FM daily now. Already leaking from breasts--discussed lynne pads for nipples. RTC 4 weeks. BE    Childbirth classes? YES, planning on it  Plan on breastfeeding? Yes: discussed classes  Birthcontrol? condoms  Sex on ultrasound? girl  Circumsion? NA  Peds doc? Not sure yet

## 2025-01-07 ENCOUNTER — PRENATAL OFFICE VISIT (OUTPATIENT)
Dept: OBGYN | Facility: CLINIC | Age: 35
End: 2025-01-07
Payer: COMMERCIAL

## 2025-01-07 VITALS
HEART RATE: 101 BPM | DIASTOLIC BLOOD PRESSURE: 72 MMHG | TEMPERATURE: 97.2 F | SYSTOLIC BLOOD PRESSURE: 120 MMHG | WEIGHT: 168 LBS | BODY MASS INDEX: 27.12 KG/M2

## 2025-01-07 DIAGNOSIS — Z23 NEED FOR TDAP VACCINATION: ICD-10-CM

## 2025-01-07 DIAGNOSIS — O09.529 ENCOUNTER FOR SUPERVISION OF MULTIGRAVIDA WITH ADVANCED MATERNAL AGE: Primary | ICD-10-CM

## 2025-01-07 PROCEDURE — 90715 TDAP VACCINE 7 YRS/> IM: CPT | Performed by: OBSTETRICS & GYNECOLOGY

## 2025-01-07 PROCEDURE — 99207 PR PRENATAL VISIT: CPT | Performed by: OBSTETRICS & GYNECOLOGY

## 2025-01-07 PROCEDURE — 90471 IMMUNIZATION ADMIN: CPT | Performed by: OBSTETRICS & GYNECOLOGY

## 2025-01-07 NOTE — PROGRESS NOTES
28w3d   Feeling well.  Baby is active.    told her she is snoring more at night. Also noticing sciatica.   Passed GCT.  Patient given tdap today.  RR

## 2025-01-23 ENCOUNTER — PRENATAL OFFICE VISIT (OUTPATIENT)
Dept: OBGYN | Facility: CLINIC | Age: 35
End: 2025-01-23
Payer: COMMERCIAL

## 2025-01-23 VITALS
SYSTOLIC BLOOD PRESSURE: 107 MMHG | OXYGEN SATURATION: 98 % | BODY MASS INDEX: 27.49 KG/M2 | TEMPERATURE: 97.6 F | WEIGHT: 170.3 LBS | HEART RATE: 88 BPM | DIASTOLIC BLOOD PRESSURE: 68 MMHG

## 2025-01-23 DIAGNOSIS — O09.529 ENCOUNTER FOR SUPERVISION OF MULTIGRAVIDA WITH ADVANCED MATERNAL AGE: Primary | ICD-10-CM

## 2025-01-23 NOTE — PATIENT INSTRUCTIONS
Weeks 26 to 30 of Your Pregnancy: Care Instructions  You're starting your last trimester. You'll probably feel your baby moving around more. Your back may ache as your body gets used to your baby's size and length. Take care of yourself, and pay attention to what your body needs.    Talk to your doctor about getting the Tdap shot. It will help protect your  against whooping cough (pertussis). Also ask your doctor about flu and COVID-19 shots if you haven't had them yet. If your blood type is Rh negative, you may be given a shot of Rh immune globulin (such as RhoGAM). It can help prevent problems for your baby.   You may have [place] contractions. They are single or several strong contractions without a pattern. These are practice contractions but not the start of labor.   Be kind to yourself.       Take breaks when you're tired.  Change positions often. Don't sit for too long or stand for too long.  At work, rest during breaks if you can. If you don't get breaks, talk to your doctor about writing a letter to your employer to request them.  Avoid fumes, chemicals, and tobacco smoke.  Be sexual if you want to.       You may be interested in sex, or you may not. Everyone is different.  Sex is okay unless your doctor tells you not to.  Your belly can make it hard to find good positions for sex. Ono and explore.  Watch for signs of  labor.        These signs include:  Menstrual-like cramps. Or you may have pain or pressure in your pelvis that happens in a pattern.  About 6 or more contractions in an hour (even after rest and a glass of water).  A low, dull backache that doesn't go away when you change positions.  An increase or change in vaginal discharge.  Light vaginal bleeding or spotting.  Your water breaking.  Know what to do if you think you are having contractions.       Drink 1 or 2 glasses of water.  Lie down on your left side for at least an hour.  While on your side, feel the top of  "your belly to see if it's tight.  Write down your contractions for an hour. Time how long it is from the start of one contraction to the start of the next.  Call your doctor if you have regular contractions.  Follow-up care is a key part of your treatment and safety. Be sure to make and go to all appointments, and call your doctor if you are having problems. It's also a good idea to know your test results and keep a list of the medicines you take.  Where can you learn more?  Go to https://www.[employer].cl.net/patiented  Enter S999 in the search box to learn more about \"Weeks 26 to 30 of Your Pregnancy: Care Instructions.\"  Current as of: April 30, 2024  Content Version: 14.3    2024 [employer].   Care instructions adapted under license by your healthcare professional. If you have questions about a medical condition or this instruction, always ask your healthcare professional. [employer] disclaims any warranty or liability for your use of this information.    Weeks 30 to 32 of Your Pregnancy: Care Instructions  Your baby is growing more every day. Its eyes can open and close, and it may have hair on its head. Your baby may sleep 20 to 45 minutes at a time and is more active at certain times.    You should feel your baby move several times every day. Your baby now turns less and kicks more.   This is a good time to tour your hospital or birthing center. You may also want to find childcare if needed.         To ease heartburn   Avoid foods that make your symptoms worse, such as chocolate, spicy foods, and caffeine.  Avoid bending over or lying down after meals.  Do not eat for 2 hours before bedtime.  Take antacids like Tums, but don't take ones that have sodium bicarbonate, magnesium trisilicate, or aspirin.        To care for large, swollen veins (varicose veins)   Try to avoid standing for long periods of time.  Sit with your feet propped up.  Wear support hose.  Get some exercise every day, " "like walking or swimming.  Counting your baby's kicks  Your doctor may ask you to count your baby's movements, such as kicks, flutters, or rolls.    Find a quiet place, and get comfortable. Write down your start time. Count your baby's movements (except hiccups). When your baby has moved 10 times, you can stop counting. Write down how many minutes it took.   If an hour goes by and you don't feel 10 movements, have something to eat or drink. Count for another hour. If you don't feel at least 10 movements in the 2-hour period, call your doctor.   Follow-up care is a key part of your treatment and safety. Be sure to make and go to all appointments, and call your doctor if you are having problems. It's also a good idea to know your test results and keep a list of the medicines you take.  Where can you learn more?  Go to https://www.[employer]/patiented  Enter X471 in the search box to learn more about \"Weeks 30 to 32 of Your Pregnancy: Care Instructions.\"  Current as of: April 30, 2024  Content Version: 14.3    2024 [employer].   Care instructions adapted under license by your healthcare professional. If you have questions about a medical condition or this instruction, always ask your healthcare professional. [employer] disclaims any warranty or liability for your use of this information.    Learning About Birth Control After Childbirth  Birth control is any method used to prevent pregnancy. If you have vaginal sex without birth control, you could get pregnant--even if you haven't started having periods again. You're less likely to get pregnant while breastfeeding, but it's still possible. Finding birth control that works for you can help avoid an unplanned pregnancy.  There are many kinds of birth control. Each has pros and cons. Find what works for you. Talk to your doctor if you've just given birth or are breastfeeding.    Long-acting reversible contraception (LARC). These are placed " "inside your body by a doctor. They can prevent pregnancy for years.  Examples include:  An implant (hormonal).  Copper intrauterine device (IUD).  Hormonal IUDs.    Short-acting hormonal methods. These release hormones. Examples include:  Combination birth control pills (\"the pill\").  Skin patches.  A vaginal ring.  A shot.  Mini-pills. Choose progestin-only options soon after giving birth.    Barrier methods. Use these every time you have vaginal sex.  Examples include:  External (male) condoms.  Internal (female) condoms.  Diaphragms.  Cervical caps.  Sponges.    Spermicides. These kill sperm or stop sperm from moving. They can be gels, creams, foams, films, or tablets. Use them before vaginal sex.  Examples include:  Nonoxynol-9.  pH regulator gel.    Permanent birth control (sterilization). This can be an option if you're sure that you don't want to get pregnant later.  Examples include:  Vasectomy.  Having tubes tied (tubal ligation).    Emergency contraception. This is a backup method. Use it if you didn't use birth control or your birth control method failed.  Examples include:  Copper and hormonal IUDs.  Emergency contraceptive pills.    Fertility awareness. You'll learn when you are most likely to become pregnant (are fertile). You can avoid vaginal sex at that time.  It's also called:  Natural family planning.  The rhythm method.    Breastfeeding. This is most effective when all of these are true:  Your baby is younger than 6 months old.  You're breastfeeding and not bottle-feeding at all.  You aren't having periods.  Follow-up care is a key part of your treatment and safety. Be sure to make and go to all appointments, and call your doctor if you are having problems. It's also a good idea to know your test results and keep a list of the medicines you take.  Where can you learn more?  Go to https://www.healthwise.net/patiented  Enter X408 in the search box to learn more about \"Learning About Birth Control " "After Childbirth.\"  Current as of: April 30, 2024  Content Version: 14.3    2024 [employer].   Care instructions adapted under license by your healthcare professional. If you have questions about a medical condition or this instruction, always ask your healthcare professional. [employer] disclaims any warranty or liability for your use of this information.    Weeks 32 to 34 of Your Pregnancy: Care Instructions    Decide whether you want to bank or donate your baby's umbilical cord blood. If you want to save this blood, you have to arrange for it ahead of time.   Decide about circumcision. Personal, [place], or cultural beliefs may play a role in your decision. You get to decide what you want for your baby.         Learn how to ease hemorrhoids.   Get more liquids, fruits, vegetables, and fiber in your diet.  Avoid sitting for too long.  Clean yourself with moist toilet paper. Or try witch hazel pads.  Try ice packs or warm sitz baths for discomfort.  Use hydrocortisone cream for pain or itching.  Ask your doctor about stool softeners.        Consider the benefits of breastfeeding.   It reduces your baby's risk of sudden infant death syndrome (SIDS).   babies are less likely to get certain infections. And they're less likely to be obese or get diabetes later in life.  It can lower your risk of breast and ovarian cancers and osteoporosis.  It saves you money.  Follow-up care is a key part of your treatment and safety. Be sure to make and go to all appointments, and call your doctor if you are having problems. It's also a good idea to know your test results and keep a list of the medicines you take.  Where can you learn more?  Go to https://www.[employer]net/patiented  Enter X711 in the search box to learn more about \"Weeks 32 to 34 of Your Pregnancy: Care Instructions.\"  Current as of: April 30, 2024  Content Version: 14.3    2024 [employer].   Care instructions adapted " under license by your healthcare professional. If you have questions about a medical condition or this instruction, always ask your healthcare professional. [employer] disclaims any warranty or liability for your use of this information.    You have been provided the Any Day Now: Early Labor at Home document.    Additional copies can be found here:  [email or web address]  You have been provided the What I'd Wish I'd Known About Giving Birth document.    Additional copies can be found here:  [email or web address]

## 2025-01-23 NOTE — PROGRESS NOTES
30.5 wks here for HANK  +FM  Denies cramping, ctx, bleeding or LOF  Pump script given and discussed how to fill  -will be sending over FMLA paperwork for work   -reminder of RN triage line # how to call  Feeling well- no concerns  RN triage for fkc, srom, ptl, vag bleeding, pre-e  Rtc 2 wks  ANAY Mcnair CNP

## 2025-02-05 NOTE — PATIENT INSTRUCTIONS
"Weeks 32 to 34 of Your Pregnancy: Care Instructions    Decide whether you want to bank or donate your baby's umbilical cord blood. If you want to save this blood, you have to arrange for it ahead of time.   Decide about circumcision. Personal, Buddhism, or cultural beliefs may play a role in your decision. You get to decide what you want for your baby.         Learn how to ease hemorrhoids.   Get more liquids, fruits, vegetables, and fiber in your diet.  Avoid sitting for too long.  Clean yourself with moist toilet paper. Or try witch hazel pads.  Try ice packs or warm sitz baths for discomfort.  Use hydrocortisone cream for pain or itching.  Ask your doctor about stool softeners.        Consider the benefits of breastfeeding.   It reduces your baby's risk of sudden infant death syndrome (SIDS).   babies are less likely to get certain infections. And they're less likely to be obese or get diabetes later in life.  It can lower your risk of breast and ovarian cancers and osteoporosis.  It saves you money.  Follow-up care is a key part of your treatment and safety. Be sure to make and go to all appointments, and call your doctor if you are having problems. It's also a good idea to know your test results and keep a list of the medicines you take.  Where can you learn more?  Go to https://www.Games2Win.net/patiented  Enter X711 in the search box to learn more about \"Weeks 32 to 34 of Your Pregnancy: Care Instructions.\"  Current as of: April 30, 2024  Content Version: 14.3    2024 [employer].   Care instructions adapted under license by your healthcare professional. If you have questions about a medical condition or this instruction, always ask your healthcare professional. [employer] disclaims any warranty or liability for your use of this information.    You have been provided the Any Day Now: Early Labor at Home document.    Additional copies can be found here:  " [email or web address]  You have been provided the What I'd Wish I'd Known About Giving Birth document.    Additional copies can be found here:  [email or web address]

## 2025-02-06 ENCOUNTER — PRENATAL OFFICE VISIT (OUTPATIENT)
Dept: OBGYN | Facility: CLINIC | Age: 35
End: 2025-02-06
Payer: COMMERCIAL

## 2025-02-06 VITALS
WEIGHT: 172.5 LBS | BODY MASS INDEX: 27.84 KG/M2 | SYSTOLIC BLOOD PRESSURE: 116 MMHG | HEART RATE: 84 BPM | DIASTOLIC BLOOD PRESSURE: 66 MMHG

## 2025-02-06 DIAGNOSIS — O09.529 ENCOUNTER FOR SUPERVISION OF MULTIGRAVIDA WITH ADVANCED MATERNAL AGE: Primary | ICD-10-CM

## 2025-02-06 NOTE — PROGRESS NOTES
Having baby shower next week and one @35 wks. Not sure if doing CB classes or not, scheduled for on line. Discussed epidural and menu of options. Has appts scheduled. BE

## 2025-02-20 ENCOUNTER — PRENATAL OFFICE VISIT (OUTPATIENT)
Dept: OBGYN | Facility: CLINIC | Age: 35
End: 2025-02-20
Payer: COMMERCIAL

## 2025-02-20 VITALS
SYSTOLIC BLOOD PRESSURE: 116 MMHG | HEART RATE: 70 BPM | BODY MASS INDEX: 28.04 KG/M2 | WEIGHT: 173.7 LBS | OXYGEN SATURATION: 99 % | DIASTOLIC BLOOD PRESSURE: 70 MMHG

## 2025-02-20 DIAGNOSIS — O09.529 ENCOUNTER FOR SUPERVISION OF MULTIGRAVIDA WITH ADVANCED MATERNAL AGE: Primary | ICD-10-CM

## 2025-02-20 NOTE — PROGRESS NOTES
Has baby shower next 2 weekends. Having some period cramps about 2 nights ago. Discussed normal and reviewed contractions. Having bilateral carpal tunnel. RTC as scheduled and plan GBS next visit. BE

## 2025-03-04 ENCOUNTER — PRENATAL OFFICE VISIT (OUTPATIENT)
Dept: OBGYN | Facility: CLINIC | Age: 35
End: 2025-03-04
Payer: COMMERCIAL

## 2025-03-04 VITALS
BODY MASS INDEX: 29.1 KG/M2 | OXYGEN SATURATION: 99 % | HEART RATE: 87 BPM | WEIGHT: 180.3 LBS | SYSTOLIC BLOOD PRESSURE: 122 MMHG | DIASTOLIC BLOOD PRESSURE: 74 MMHG

## 2025-03-04 DIAGNOSIS — O09.529 ENCOUNTER FOR SUPERVISION OF MULTIGRAVIDA WITH ADVANCED MATERNAL AGE: Primary | ICD-10-CM

## 2025-03-04 LAB
ERYTHROCYTE [DISTWIDTH] IN BLOOD BY AUTOMATED COUNT: 12.8 % (ref 10–15)
FERRITIN SERPL-MCNC: 16 NG/ML (ref 6–175)
HCT VFR BLD AUTO: 37 % (ref 35–47)
HGB BLD-MCNC: 12.6 G/DL (ref 11.7–15.7)
HOLD SPECIMEN: NORMAL
IRON BINDING CAPACITY (ROCHE): 430 UG/DL (ref 240–430)
IRON SATN MFR SERPL: 43 % (ref 15–46)
IRON SERPL-MCNC: 185 UG/DL (ref 37–145)
MCH RBC QN AUTO: 32.6 PG (ref 26.5–33)
MCHC RBC AUTO-ENTMCNC: 34.1 G/DL (ref 31.5–36.5)
MCV RBC AUTO: 96 FL (ref 78–100)
PLATELET # BLD AUTO: 249 10E3/UL (ref 150–450)
RBC # BLD AUTO: 3.87 10E6/UL (ref 3.8–5.2)
T PALLIDUM AB SER QL: NONREACTIVE
WBC # BLD AUTO: 10.5 10E3/UL (ref 4–11)

## 2025-03-04 PROCEDURE — 85027 COMPLETE CBC AUTOMATED: CPT | Performed by: OBSTETRICS & GYNECOLOGY

## 2025-03-04 PROCEDURE — 87653 STREP B DNA AMP PROBE: CPT | Performed by: OBSTETRICS & GYNECOLOGY

## 2025-03-04 PROCEDURE — 0502F SUBSEQUENT PRENATAL CARE: CPT | Performed by: OBSTETRICS & GYNECOLOGY

## 2025-03-04 PROCEDURE — 99207 PR PRENATAL VISIT: CPT | Performed by: OBSTETRICS & GYNECOLOGY

## 2025-03-04 PROCEDURE — 83550 IRON BINDING TEST: CPT | Performed by: OBSTETRICS & GYNECOLOGY

## 2025-03-04 PROCEDURE — 82728 ASSAY OF FERRITIN: CPT | Performed by: OBSTETRICS & GYNECOLOGY

## 2025-03-04 PROCEDURE — 3074F SYST BP LT 130 MM HG: CPT | Performed by: OBSTETRICS & GYNECOLOGY

## 2025-03-04 PROCEDURE — 83540 ASSAY OF IRON: CPT | Performed by: OBSTETRICS & GYNECOLOGY

## 2025-03-04 PROCEDURE — 86780 TREPONEMA PALLIDUM: CPT | Performed by: OBSTETRICS & GYNECOLOGY

## 2025-03-04 PROCEDURE — 3078F DIAST BP <80 MM HG: CPT | Performed by: OBSTETRICS & GYNECOLOGY

## 2025-03-04 PROCEDURE — 36415 COLL VENOUS BLD VENIPUNCTURE: CPT | Performed by: OBSTETRICS & GYNECOLOGY

## 2025-03-04 NOTE — PROGRESS NOTES
GBS today and BSUS confirms cephalic. Prefers no IOL until 41 wks. Had good baby showers. Preeclampsia signs and sx discussed--will call if any develop. RTC weekly until delivery.  BE

## 2025-03-05 LAB — GP B STREP DNA SPEC QL NAA+PROBE: NEGATIVE

## 2025-03-09 ENCOUNTER — HEALTH MAINTENANCE LETTER (OUTPATIENT)
Age: 35
End: 2025-03-09

## 2025-03-12 NOTE — PATIENT INSTRUCTIONS
"Week 37 of Your Pregnancy: Care Instructions    Most babies are born between 37 and 40 weeks.   This is a good time to pack a bag to take with you to the birth. Then it will be ready to go when you are.     Learn about breastfeeding.  For example, find out about ways to hold your baby to make breastfeeding easier. And think about learning how to pump and store milk.     Know that crying is normal.  It's common for babies to cry 1 to 3 hours a day. Some cry more, and some cry less.     Learn why babies cry.  They may be hungry; have gas; need a diaper change; or feel cold, warm, tired, lonely, or tense. Sometimes they cry for unknown reasons.     Think about what will help you stay calm when your baby cries.  Taking slow, deep breaths can help. So can taking a break. It's okay to put your baby somewhere safe (like their crib) and walk away for a few minutes.     Learn about safe sleep for your baby.  Always put your baby to sleep on their back. Place them alone in a crib or bassinet with a firm, flat surface. Keep soft items like stuffed animals out of the crib.     Learn what to expect with  poop.  Your baby will have their own bowel patterns. Some babies have several bowel movements a day. Some have fewer.     Know that  babies will often have loose, yellow bowel movements.  Formula-fed babies have more formed stools. If your baby's poop looks like pellets, your baby is constipated.   Follow-up care is a key part of your treatment and safety. Be sure to make and go to all appointments, and call your doctor if you are having problems. It's also a good idea to know your test results and keep a list of the medicines you take.  Where can you learn more?  Go to https://www.[employer].net/patiented  Enter N257 in the search box to learn more about \"Week 37 of Your Pregnancy: Care Instructions.\"  Current as of: 2024  Content Version: 14.3    2024 [facility] [employer].   Care instructions " adapted under license by your healthcare professional. If you have questions about a medical condition or this instruction, always ask your healthcare professional. [employer] disclaims any warranty or liability for your use of this information.

## 2025-03-14 ENCOUNTER — PRENATAL OFFICE VISIT (OUTPATIENT)
Dept: OBGYN | Facility: CLINIC | Age: 35
End: 2025-03-14
Payer: COMMERCIAL

## 2025-03-19 ENCOUNTER — PRENATAL OFFICE VISIT (OUTPATIENT)
Dept: OBGYN | Facility: CLINIC | Age: 35
End: 2025-03-19
Payer: COMMERCIAL

## 2025-03-19 VITALS
DIASTOLIC BLOOD PRESSURE: 74 MMHG | OXYGEN SATURATION: 99 % | SYSTOLIC BLOOD PRESSURE: 120 MMHG | TEMPERATURE: 97.6 F | HEART RATE: 86 BPM | WEIGHT: 179.6 LBS | BODY MASS INDEX: 29.89 KG/M2

## 2025-03-19 DIAGNOSIS — Z34.03 ENCOUNTER FOR SUPERVISION OF NORMAL FIRST PREGNANCY IN THIRD TRIMESTER: Primary | ICD-10-CM

## 2025-03-19 PROCEDURE — 0502F SUBSEQUENT PRENATAL CARE: CPT | Performed by: OBSTETRICS & GYNECOLOGY

## 2025-03-19 PROCEDURE — 99207 PR PRENATAL VISIT: CPT | Performed by: OBSTETRICS & GYNECOLOGY

## 2025-03-19 PROCEDURE — 3074F SYST BP LT 130 MM HG: CPT | Performed by: OBSTETRICS & GYNECOLOGY

## 2025-03-19 PROCEDURE — 3078F DIAST BP <80 MM HG: CPT | Performed by: OBSTETRICS & GYNECOLOGY

## 2025-03-19 NOTE — PATIENT INSTRUCTIONS
Week 38 of Your Pregnancy: Care Instructions  Believe it or not, your baby is almost here. You may notice how your baby responds to sounds, warmth, cold, and light. You may even know what kind of music your baby likes.    Even if you expect a vaginal birth, it's a good idea to learn about  section ().  is the delivery of a baby through a cut (incision) in your belly. It's a major surgery, so it has more risks than a vaginal birth.   During the first 2 weeks after the birth, limit visitors. Don't allow visitors who have colds or infections. Ask visitors to wash their hands before they touch your baby. And never let anyone smoke around your baby.     Know about unplanned C-sections.  Reasons for an unplanned  include labor that slows or stops, signs of distress in your baby, and high blood pressure or other problems for you.     Know about planned C-sections.  If your baby isn't in a head-down position for delivery (breech position), your doctor may plan a . Or you may have a planned  if you're having twins or more.     Get as much help as you can while you're in the hospital.  You can learn about feeding, diapering, and bathing your baby.     Talk to your doctor or midwife about how to care for the umbilical cord stump.  If your baby will be circumcised, also ask about how to care for that.     Ask friends or family for help, as you need it.  If you can, have another adult in your home for at least 2 or 3 days after the birth.     Try to nap when your baby naps.  This may be your best chance to get some sleep.     Watch for changes in your mental health.  For the first 1 to 2 weeks after birth, it's common to cry or feel sad or irritable. If these feelings last for more than 2 weeks, you may have postpartum depression. Ask your doctor for help. Postpartum depression can be treated.   Follow-up care is a key part of your treatment and safety. Be sure to make and go  "to all appointments, and call your doctor if you are having problems. It's also a good idea to know your test results and keep a list of the medicines you take.  Where can you learn more?  Go to https://www.[employer].net/patiented  Enter B044 in the search box to learn more about \"Week 38 of Your Pregnancy: Care Instructions.\"  Current as of: April 30, 2024  Content Version: 14.4    9838-0083 [employer].   Care instructions adapted under license by your healthcare professional. If you have questions about a medical condition or this instruction, always ask your healthcare professional. [employer] disclaims any warranty or liability for your use of this information.    "

## 2025-03-19 NOTE — PROGRESS NOTES
38w4d overall feeling ok, tired.  No ctx that she can tell.  Good FM.  Desires no IOL until 41w if possible.   Labor instructions and kick counts reviewed. RTC weekly  carolina

## 2025-03-27 ENCOUNTER — PRENATAL OFFICE VISIT (OUTPATIENT)
Dept: OBGYN | Facility: CLINIC | Age: 35
End: 2025-03-27
Payer: COMMERCIAL

## 2025-03-27 VITALS
TEMPERATURE: 97 F | BODY MASS INDEX: 30.62 KG/M2 | SYSTOLIC BLOOD PRESSURE: 125 MMHG | HEART RATE: 103 BPM | DIASTOLIC BLOOD PRESSURE: 78 MMHG | WEIGHT: 184 LBS

## 2025-03-27 DIAGNOSIS — O36.8130 DECREASED FETAL MOVEMENTS IN THIRD TRIMESTER, SINGLE OR UNSPECIFIED FETUS: ICD-10-CM

## 2025-03-27 DIAGNOSIS — O09.512 ELDERLY PRIMIGRAVIDA IN SECOND TRIMESTER: Primary | ICD-10-CM

## 2025-03-27 NOTE — PROGRESS NOTES
39w3d   Doing well overall. Not feeling contractions. Baby is not moving a lot.  Yesterday baby was not moving much.   This morning moving but not the big movements she normally feels.    Feeling like baby has dropped. Swelling has increased with numbness in fingers.   Cervix is closed.  NST performed for decreased FM.   EFM:   130 baseline, moderate variablility, + accels, no decels, Category I   [place]: no ctx's   Patient reassured, NST looks really normal. discussed fetal sleep patterns.   discussed IOL, but patient wants to avoid.  That seems very daunting to her.   Will have next ob appt and US for BPP in < 1 wk. In the mean time, if she notices decreased FM, she should present to the hospital and at that time IOL would be recommended. RR

## 2025-03-28 ENCOUNTER — ANESTHESIA EVENT (OUTPATIENT)
Dept: OBGYN | Facility: CLINIC | Age: 35
End: 2025-03-28
Payer: COMMERCIAL

## 2025-03-28 ENCOUNTER — HOSPITAL ENCOUNTER (INPATIENT)
Facility: CLINIC | Age: 35
LOS: 1 days | Discharge: HOME | End: 2025-03-29
Attending: OBSTETRICS & GYNECOLOGY | Admitting: OBSTETRICS & GYNECOLOGY
Payer: COMMERCIAL

## 2025-03-28 ENCOUNTER — ANESTHESIA (OUTPATIENT)
Dept: OBGYN | Facility: CLINIC | Age: 35
End: 2025-03-28
Payer: COMMERCIAL

## 2025-03-28 ENCOUNTER — TELEPHONE (OUTPATIENT)
Dept: OBGYN | Facility: CLINIC | Age: 35
End: 2025-03-28

## 2025-03-28 LAB
ABO + RH BLD: NORMAL
BLD GP AB SCN SERPL QL: NEGATIVE
ERYTHROCYTE [DISTWIDTH] IN BLOOD BY AUTOMATED COUNT: 12.6 % (ref 10–15)
HCT VFR BLD AUTO: 36.8 % (ref 35–47)
HGB BLD-MCNC: 13.2 G/DL (ref 11.7–15.7)
MCH RBC QN AUTO: 33.6 PG (ref 26.5–33)
MCHC RBC AUTO-ENTMCNC: 35.9 G/DL (ref 31.5–36.5)
MCV RBC AUTO: 94 FL (ref 78–100)
PLATELET # BLD AUTO: 209 10E3/UL (ref 150–450)
RBC # BLD AUTO: 3.93 10E6/UL (ref 3.8–5.2)
SPECIMEN EXP DATE BLD: NORMAL
T PALLIDUM AB SER QL: NONREACTIVE
WBC # BLD AUTO: 16.4 10E3/UL (ref 4–11)

## 2025-03-28 PROCEDURE — 0UQMXZZ REPAIR VULVA, EXTERNAL APPROACH: ICD-10-PCS | Performed by: OBSTETRICS & GYNECOLOGY

## 2025-03-28 PROCEDURE — 250N000011 HC RX IP 250 OP 636: Mod: JZ

## 2025-03-28 PROCEDURE — 120N000002 HC R&B MED SURG/OB UMMC

## 2025-03-28 PROCEDURE — 250N000013 HC RX MED GY IP 250 OP 250 PS 637

## 2025-03-28 PROCEDURE — 86900 BLOOD TYPING SEROLOGIC ABO: CPT

## 2025-03-28 PROCEDURE — 370N000003 HC ANESTHESIA WARD SERVICE: Performed by: ANESTHESIOLOGY

## 2025-03-28 PROCEDURE — 0HQ9XZZ REPAIR PERINEUM SKIN, EXTERNAL APPROACH: ICD-10-PCS | Performed by: OBSTETRICS & GYNECOLOGY

## 2025-03-28 PROCEDURE — 722N000001 HC LABOR CARE VAGINAL DELIVERY SINGLE

## 2025-03-28 PROCEDURE — 86850 RBC ANTIBODY SCREEN: CPT

## 2025-03-28 PROCEDURE — 00HU33Z INSERTION OF INFUSION DEVICE INTO SPINAL CANAL, PERCUTANEOUS APPROACH: ICD-10-PCS | Performed by: ANESTHESIOLOGY

## 2025-03-28 PROCEDURE — 59400 OBSTETRICAL CARE: CPT | Mod: GC | Performed by: OBSTETRICS & GYNECOLOGY

## 2025-03-28 PROCEDURE — 258N000003 HC RX IP 258 OP 636

## 2025-03-28 PROCEDURE — 86780 TREPONEMA PALLIDUM: CPT

## 2025-03-28 PROCEDURE — 85027 COMPLETE CBC AUTOMATED: CPT

## 2025-03-28 PROCEDURE — 250N000009 HC RX 250

## 2025-03-28 PROCEDURE — 3E0R3BZ INTRODUCTION OF ANESTHETIC AGENT INTO SPINAL CANAL, PERCUTANEOUS APPROACH: ICD-10-PCS | Performed by: ANESTHESIOLOGY

## 2025-03-28 PROCEDURE — 250N000011 HC RX IP 250 OP 636

## 2025-03-28 PROCEDURE — G0463 HOSPITAL OUTPT CLINIC VISIT: HCPCS

## 2025-03-28 RX ORDER — ACETAMINOPHEN 325 MG/1
650 TABLET ORAL EVERY 4 HOURS PRN
Status: DISCONTINUED | OUTPATIENT
Start: 2025-03-28 | End: 2025-03-28 | Stop reason: HOSPADM

## 2025-03-28 RX ORDER — CALCIUM CARBONATE 500 MG/1
500 TABLET, CHEWABLE ORAL DAILY PRN
Status: DISCONTINUED | OUTPATIENT
Start: 2025-03-28 | End: 2025-03-28

## 2025-03-28 RX ORDER — MISOPROSTOL 200 UG/1
800 TABLET ORAL
Status: DISCONTINUED | OUTPATIENT
Start: 2025-03-28 | End: 2025-03-28 | Stop reason: HOSPADM

## 2025-03-28 RX ORDER — OXYTOCIN/0.9 % SODIUM CHLORIDE 30/500 ML
100-340 PLASTIC BAG, INJECTION (ML) INTRAVENOUS CONTINUOUS PRN
Status: DISCONTINUED | OUTPATIENT
Start: 2025-03-28 | End: 2025-03-28

## 2025-03-28 RX ORDER — METOCLOPRAMIDE 10 MG/1
10 TABLET ORAL EVERY 6 HOURS PRN
Status: DISCONTINUED | OUTPATIENT
Start: 2025-03-28 | End: 2025-03-28 | Stop reason: HOSPADM

## 2025-03-28 RX ORDER — CARBOPROST TROMETHAMINE 250 UG/ML
250 INJECTION, SOLUTION INTRAMUSCULAR
Status: DISCONTINUED | OUTPATIENT
Start: 2025-03-28 | End: 2025-03-29 | Stop reason: HOSPADM

## 2025-03-28 RX ORDER — OXYTOCIN/0.9 % SODIUM CHLORIDE 30/500 ML
PLASTIC BAG, INJECTION (ML) INTRAVENOUS
Status: DISCONTINUED
Start: 2025-03-28 | End: 2025-03-28 | Stop reason: HOSPADM

## 2025-03-28 RX ORDER — KETOROLAC TROMETHAMINE 30 MG/ML
15 INJECTION, SOLUTION INTRAMUSCULAR; INTRAVENOUS
Status: DISCONTINUED | OUTPATIENT
Start: 2025-03-28 | End: 2025-03-28 | Stop reason: HOSPADM

## 2025-03-28 RX ORDER — OXYTOCIN/0.9 % SODIUM CHLORIDE 30/500 ML
340 PLASTIC BAG, INJECTION (ML) INTRAVENOUS CONTINUOUS PRN
Status: DISCONTINUED | OUTPATIENT
Start: 2025-03-28 | End: 2025-03-28 | Stop reason: HOSPADM

## 2025-03-28 RX ORDER — OXYTOCIN 10 [USP'U]/ML
10 INJECTION, SOLUTION INTRAMUSCULAR; INTRAVENOUS
Status: DISCONTINUED | OUTPATIENT
Start: 2025-03-28 | End: 2025-03-28 | Stop reason: HOSPADM

## 2025-03-28 RX ORDER — HYDROCORTISONE 25 MG/G
CREAM TOPICAL 3 TIMES DAILY PRN
Status: DISCONTINUED | OUTPATIENT
Start: 2025-03-28 | End: 2025-03-29 | Stop reason: HOSPADM

## 2025-03-28 RX ORDER — IBUPROFEN 800 MG/1
800 TABLET, FILM COATED ORAL EVERY 6 HOURS PRN
Status: DISCONTINUED | OUTPATIENT
Start: 2025-03-28 | End: 2025-03-29 | Stop reason: HOSPADM

## 2025-03-28 RX ORDER — MISOPROSTOL 200 UG/1
400 TABLET ORAL
Status: DISCONTINUED | OUTPATIENT
Start: 2025-03-28 | End: 2025-03-29 | Stop reason: HOSPADM

## 2025-03-28 RX ORDER — NALOXONE HYDROCHLORIDE 0.4 MG/ML
0.2 INJECTION, SOLUTION INTRAMUSCULAR; INTRAVENOUS; SUBCUTANEOUS
Status: DISCONTINUED | OUTPATIENT
Start: 2025-03-28 | End: 2025-03-28

## 2025-03-28 RX ORDER — IBUPROFEN 800 MG/1
800 TABLET, FILM COATED ORAL
Status: DISCONTINUED | OUTPATIENT
Start: 2025-03-28 | End: 2025-03-28 | Stop reason: HOSPADM

## 2025-03-28 RX ORDER — TRANEXAMIC ACID 10 MG/ML
1 INJECTION, SOLUTION INTRAVENOUS EVERY 30 MIN PRN
Status: DISCONTINUED | OUTPATIENT
Start: 2025-03-28 | End: 2025-03-29 | Stop reason: HOSPADM

## 2025-03-28 RX ORDER — FENTANYL CITRATE 50 UG/ML
100 INJECTION, SOLUTION INTRAMUSCULAR; INTRAVENOUS
Status: DISCONTINUED | OUTPATIENT
Start: 2025-03-28 | End: 2025-03-28 | Stop reason: HOSPADM

## 2025-03-28 RX ORDER — FENTANYL/ROPIVACAINE/NS/PF 2MCG/ML-.1
PLASTIC BAG, INJECTION (ML) EPIDURAL
Status: DISCONTINUED | OUTPATIENT
Start: 2025-03-28 | End: 2025-03-28 | Stop reason: HOSPADM

## 2025-03-28 RX ORDER — LOPERAMIDE HYDROCHLORIDE 2 MG/1
2 CAPSULE ORAL
Status: DISCONTINUED | OUTPATIENT
Start: 2025-03-28 | End: 2025-03-28 | Stop reason: HOSPADM

## 2025-03-28 RX ORDER — ONDANSETRON 2 MG/ML
4 INJECTION INTRAMUSCULAR; INTRAVENOUS EVERY 6 HOURS PRN
Status: DISCONTINUED | OUTPATIENT
Start: 2025-03-28 | End: 2025-03-28 | Stop reason: HOSPADM

## 2025-03-28 RX ORDER — MISOPROSTOL 200 UG/1
TABLET ORAL
Status: DISCONTINUED
Start: 2025-03-28 | End: 2025-03-28 | Stop reason: HOSPADM

## 2025-03-28 RX ORDER — CITRIC ACID/SODIUM CITRATE 334-500MG
30 SOLUTION, ORAL ORAL
Status: DISCONTINUED | OUTPATIENT
Start: 2025-03-28 | End: 2025-03-28 | Stop reason: HOSPADM

## 2025-03-28 RX ORDER — OXYTOCIN 10 [USP'U]/ML
10 INJECTION, SOLUTION INTRAMUSCULAR; INTRAVENOUS
Status: DISCONTINUED | OUTPATIENT
Start: 2025-03-28 | End: 2025-03-28

## 2025-03-28 RX ORDER — LOPERAMIDE HYDROCHLORIDE 2 MG/1
4 CAPSULE ORAL
Status: DISCONTINUED | OUTPATIENT
Start: 2025-03-28 | End: 2025-03-29 | Stop reason: HOSPADM

## 2025-03-28 RX ORDER — LOPERAMIDE HYDROCHLORIDE 2 MG/1
2 CAPSULE ORAL
Status: DISCONTINUED | OUTPATIENT
Start: 2025-03-28 | End: 2025-03-29 | Stop reason: HOSPADM

## 2025-03-28 RX ORDER — ONDANSETRON 4 MG/1
4 TABLET, ORALLY DISINTEGRATING ORAL EVERY 6 HOURS PRN
Status: DISCONTINUED | OUTPATIENT
Start: 2025-03-28 | End: 2025-03-28 | Stop reason: HOSPADM

## 2025-03-28 RX ORDER — AMOXICILLIN 250 MG
2 CAPSULE ORAL
Status: DISCONTINUED | OUTPATIENT
Start: 2025-03-28 | End: 2025-03-29 | Stop reason: HOSPADM

## 2025-03-28 RX ORDER — PROCHLORPERAZINE MALEATE 10 MG
10 TABLET ORAL EVERY 6 HOURS PRN
Status: DISCONTINUED | OUTPATIENT
Start: 2025-03-28 | End: 2025-03-28 | Stop reason: HOSPADM

## 2025-03-28 RX ORDER — ACETAMINOPHEN 325 MG/1
650 TABLET ORAL EVERY 4 HOURS PRN
Status: DISCONTINUED | OUTPATIENT
Start: 2025-03-28 | End: 2025-03-29 | Stop reason: HOSPADM

## 2025-03-28 RX ORDER — LIDOCAINE 40 MG/G
CREAM TOPICAL
Status: DISCONTINUED | OUTPATIENT
Start: 2025-03-28 | End: 2025-03-28

## 2025-03-28 RX ORDER — MISOPROSTOL 200 UG/1
800 TABLET ORAL
Status: DISCONTINUED | OUTPATIENT
Start: 2025-03-28 | End: 2025-03-29 | Stop reason: HOSPADM

## 2025-03-28 RX ORDER — METHYLERGONOVINE MALEATE 0.2 MG/ML
200 INJECTION INTRAVENOUS
Status: DISCONTINUED | OUTPATIENT
Start: 2025-03-28 | End: 2025-03-29 | Stop reason: HOSPADM

## 2025-03-28 RX ORDER — MISOPROSTOL 200 UG/1
400 TABLET ORAL
Status: DISCONTINUED | OUTPATIENT
Start: 2025-03-28 | End: 2025-03-28 | Stop reason: HOSPADM

## 2025-03-28 RX ORDER — NALOXONE HYDROCHLORIDE 0.4 MG/ML
0.4 INJECTION, SOLUTION INTRAMUSCULAR; INTRAVENOUS; SUBCUTANEOUS
Status: DISCONTINUED | OUTPATIENT
Start: 2025-03-28 | End: 2025-03-28

## 2025-03-28 RX ORDER — LOPERAMIDE HYDROCHLORIDE 2 MG/1
4 CAPSULE ORAL
Status: DISCONTINUED | OUTPATIENT
Start: 2025-03-28 | End: 2025-03-28 | Stop reason: HOSPADM

## 2025-03-28 RX ORDER — CARBOPROST TROMETHAMINE 250 UG/ML
250 INJECTION, SOLUTION INTRAMUSCULAR
Status: DISCONTINUED | OUTPATIENT
Start: 2025-03-28 | End: 2025-03-28 | Stop reason: HOSPADM

## 2025-03-28 RX ORDER — LIDOCAINE HYDROCHLORIDE 10 MG/ML
INJECTION, SOLUTION EPIDURAL; INFILTRATION; INTRACAUDAL; PERINEURAL
Status: DISCONTINUED
Start: 2025-03-28 | End: 2025-03-28 | Stop reason: HOSPADM

## 2025-03-28 RX ORDER — OXYTOCIN 10 [USP'U]/ML
10 INJECTION, SOLUTION INTRAMUSCULAR; INTRAVENOUS
Status: DISCONTINUED | OUTPATIENT
Start: 2025-03-28 | End: 2025-03-29 | Stop reason: HOSPADM

## 2025-03-28 RX ORDER — TRANEXAMIC ACID 10 MG/ML
1 INJECTION, SOLUTION INTRAVENOUS EVERY 30 MIN PRN
Status: DISCONTINUED | OUTPATIENT
Start: 2025-03-28 | End: 2025-03-28 | Stop reason: HOSPADM

## 2025-03-28 RX ORDER — METHYLERGONOVINE MALEATE 0.2 MG/ML
200 INJECTION INTRAVENOUS
Status: DISCONTINUED | OUTPATIENT
Start: 2025-03-28 | End: 2025-03-28 | Stop reason: HOSPADM

## 2025-03-28 RX ORDER — NALBUPHINE HYDROCHLORIDE 10 MG/ML
2.5-5 INJECTION INTRAMUSCULAR; INTRAVENOUS; SUBCUTANEOUS EVERY 6 HOURS PRN
Status: DISCONTINUED | OUTPATIENT
Start: 2025-03-28 | End: 2025-03-28

## 2025-03-28 RX ORDER — OXYTOCIN/0.9 % SODIUM CHLORIDE 30/500 ML
340 PLASTIC BAG, INJECTION (ML) INTRAVENOUS CONTINUOUS PRN
Status: DISCONTINUED | OUTPATIENT
Start: 2025-03-28 | End: 2025-03-29 | Stop reason: HOSPADM

## 2025-03-28 RX ORDER — OXYTOCIN 10 [USP'U]/ML
INJECTION, SOLUTION INTRAMUSCULAR; INTRAVENOUS
Status: DISCONTINUED
Start: 2025-03-28 | End: 2025-03-28 | Stop reason: HOSPADM

## 2025-03-28 RX ORDER — BISACODYL 10 MG
10 SUPPOSITORY, RECTAL RECTAL DAILY PRN
Status: DISCONTINUED | OUTPATIENT
Start: 2025-03-28 | End: 2025-03-29 | Stop reason: HOSPADM

## 2025-03-28 RX ORDER — CITALOPRAM HYDROBROMIDE 20 MG/1
40 TABLET ORAL DAILY
Status: DISCONTINUED | OUTPATIENT
Start: 2025-03-29 | End: 2025-03-29 | Stop reason: HOSPADM

## 2025-03-28 RX ORDER — POLYETHYLENE GLYCOL 3350 17 G/17G
17 POWDER, FOR SOLUTION ORAL DAILY PRN
Status: DISCONTINUED | OUTPATIENT
Start: 2025-03-28 | End: 2025-03-29 | Stop reason: HOSPADM

## 2025-03-28 RX ORDER — FENTANYL CITRATE-0.9 % NACL/PF 10 MCG/ML
100 PLASTIC BAG, INJECTION (ML) INTRAVENOUS EVERY 5 MIN PRN
Status: DISCONTINUED | OUTPATIENT
Start: 2025-03-28 | End: 2025-03-28 | Stop reason: HOSPADM

## 2025-03-28 RX ORDER — METOCLOPRAMIDE HYDROCHLORIDE 5 MG/ML
10 INJECTION INTRAMUSCULAR; INTRAVENOUS EVERY 6 HOURS PRN
Status: DISCONTINUED | OUTPATIENT
Start: 2025-03-28 | End: 2025-03-28 | Stop reason: HOSPADM

## 2025-03-28 RX ADMIN — Medication: at 13:07

## 2025-03-28 RX ADMIN — Medication 100 MCG: at 13:41

## 2025-03-28 RX ADMIN — Medication 340 ML/HR: at 17:42

## 2025-03-28 RX ADMIN — BUPIVACAINE HYDROCHLORIDE 10 ML: 2.5 INJECTION, SOLUTION EPIDURAL; INFILTRATION; INTRACAUDAL; PERINEURAL at 13:00

## 2025-03-28 RX ADMIN — LIDOCAINE HYDROCHLORIDE 20 ML: 10 INJECTION, SOLUTION EPIDURAL; INFILTRATION; INTRACAUDAL; PERINEURAL at 17:47

## 2025-03-28 RX ADMIN — CALCIUM CARBONATE (ANTACID) CHEW TAB 500 MG 500 MG: 500 CHEW TAB at 16:26

## 2025-03-28 RX ADMIN — IBUPROFEN 800 MG: 800 TABLET, FILM COATED ORAL at 19:53

## 2025-03-28 RX ADMIN — SODIUM CHLORIDE, SODIUM LACTATE, POTASSIUM CHLORIDE, AND CALCIUM CHLORIDE 500 ML: .6; .31; .03; .02 INJECTION, SOLUTION INTRAVENOUS at 12:37

## 2025-03-28 ASSESSMENT — ACTIVITIES OF DAILY LIVING (ADL)
ADLS_ACUITY_SCORE: 15
ADLS_ACUITY_SCORE: 41
ADLS_ACUITY_SCORE: 19
ADLS_ACUITY_SCORE: 41
ADLS_ACUITY_SCORE: 15
ADLS_ACUITY_SCORE: 19

## 2025-03-28 NOTE — H&P
[facility]  OB HISTORY AND PHYSICAL    Patient: Inga Miner   MRN#: [id number]  YOB: 1990     HPI: Inga Miner is a 35 year old  at 39w6d by LMP who presents today with painful contractions.    Started having painful contractions since around 11pm last night. She lost her mucus plug around that time as well. She now leaks fluid with almost every contraction. She has been breathing through contractions for the last hour.  Notes vaginal bleeding as well.   She reports good fetal movement.      She denies fever, chills, headache, vision changes, SOB, chest pain    Pregnancy notable for:   - AMA  - Anxiety  - h/o chronic hives    No history of asthma or high blood pressure.    OBGYN History:      OB History    Para Term  AB Living   2 0 0 0 1 0   SAB IAB Ectopic Multiple Live Births   1 0 0 0 0      # Outcome Date GA Lbr Chaparro/2nd Weight Sex Type Anes PTL Lv   2 Current            1 SAB 23 6w0d    SAB         Birth Comments: anembryonic pregnancy, took mife/miso      - Last Pap: 2022, NILM    Prenatal Lab Results:  Lab Results   Component Value Date    AS Negative 2024    HEPBANG Nonreactive 2024    HGB 13.2 2025       GBS Status: neg    Patient Active Problem List    Diagnosis Date Noted    Routine postpartum follow-up 2025     Priority: Medium    Encounter for supervision of normal first pregnancy in first trimester 2024     Priority: Medium    History of miscarriage, currently pregnant 2024     Priority: Medium    Need for diphtheria-tetanus-pertussis (Tdap) vaccine 2024     Priority: Medium    Family history of diabetes mellitus 2024     Priority: Medium    Filling defect on imaging study 2024     Priority: Medium    Mixed hyperlipidemia 2024     Priority: Medium    Elevated MCV 2024     Priority: Medium    Anxiety 2024     Priority: Medium        Past Medical History  Past Medical History:   Diagnosis Date    Anxiety 2/7/2024    Varicella      Past Surgical History  Past Surgical History:   Procedure Laterality Date    NO HISTORY OF SURGERY       Family History  Family History   Problem Relation Age of Onset    Rheumatoid Arthritis Mother     Diabetes Father     Diabetes Sister     Breast Cancer Cousin 31     Social History  Social History     Tobacco Use    Smoking status: Never     Passive exposure: Never    Smokeless tobacco: Never   Substance Use Topics    Alcohol use: Not Currently     Medications  Medications Prior to Admission   Medication Sig Dispense Refill Last Dose/Taking    acetaminophen (TYLENOL) 500 MG tablet Take 1-2 tablets (500-1,000 mg) by mouth every 6 hours as needed for mild pain. 100 tablet 0     Cetirizine HCl (ZYRTEC PO) Take 1 tablet by mouth 2 times daily.       citalopram (CELEXA) 20 MG tablet Take 1 tablet (20 mg) by mouth daily 90 tablet 1     ibuprofen (ADVIL/MOTRIN) 600 MG tablet Take 1 tablet (600 mg) by mouth every 6 hours as needed for moderate pain. Post-partum pain control 60 tablet 0     MAGNESIUM PO Take 1 tablet by mouth daily.       Prenatal Vit-DSS-Fe Cbn-FA (PRENATAL AD PO)        SENNA-docusate sodium (SENNA S) 8.6-50 MG tablet Take 1 tablet by mouth at bedtime. 100 tablet 0      Allergies  Allergies   Allergen Reactions    Clindamycin Rash        REVIEW OF SYSTEMS:  A 10 point review of systems was completed and was negative other than as noted in the HPI.    PHYSICAL EXAM  /86 (BP Location: Right arm, Patient Position: Semi-Jesus's, Cuff Size: Adult Regular)   Temp 98.5  F (36.9  C) (Oral)   Resp 20   LMP 06/22/2024   Gen: NAD  CV: warm and well perfused  Lungs: On roon air, non-labored breathing  Abd: Gravid, non-tender, non-distended  Ext: trace peripheral extremity edema    SVE: 2/90/-3 grossly ruptured, bloody show  - Exam chaperoned by Rosina Arndt RN  Membranes: s/p SROM  Presentation:  cephalic by BSUS.    FHT:  Monitoring External  FHT: Baseline 135 bpm; moderate variability; + accels; no decelerations  TOCO 4-5 contractions in 10 minutes    Studies: T&S, CBC, RPR    Assessment & Plan: 35 year old  at 39w6d by LMP admitted for spontaneous labor, SROM at 2300. Pregnancy is notable for anxiety.     # Spontaneous Labor  - Admit to labor and delivery   - Observe for spontaneous progress, consider pitocin for augmentation as needed  - Membranes: SROM 3/27 @ 2300  - Labs: CBC, T&S, RPR  - GBS neg; Antibiotics not indicated.  - Pain Control: Per patient request; Discussed options. Consideringepidural  - PPH Prophylaxis: Standard medications    # Anxiety  - Continue PTA celexa  - 1w PP mood check    # PNC  - Rh pos, Rubella immune, GCT nl, GBS neg  - Other prenatal labs wnl  - s/p flu, Tdap vaccines  - Pap UTD  - Contraception: discuss PP  - Feeding: discuss pp     # FWB:   Cat I tracing, reactive; cephalic by BSUS;   - Continuous Fetal Monitoring  - Intrauterine resuscitative measures prn    # PPH Risk:  - Low (no prior uterine incisions, conde, <5 prior vaginal deliveries, no h/o pph or bleeding disorder), Type and screen ordered     Patient seen and careplan discussed under the supervision of Dr. Terri Whitman MD  Obstetrics and Gynecology, PGY2  2025, 12:09 PM        Physician Attestation   I personally examined and evaluated this patient.  I discussed the patient with the resident/fellow and care team, and agree with the assessment and plan of care as documented in the note.     Key findings: Patient states her contractions started at about 2300 last night.  Have been overall increasing in intensity and frequency.  Some bloody show and leaking fluid.  Patient appears uncomfortable during contractions.  Cervix on initial check /-3.  Plan for expectant management.  Category 1 FHT reactive.    Terri Perera MD  Date of Service (when I saw the patient):  03/28/25

## 2025-03-28 NOTE — PROGRESS NOTES
Data: Patient admitted to room 475 at 1044. Patient is a . Prenatal record reviewed.   OB History    Para Term  AB Living   2 0 0 0 1 0   SAB IAB Ectopic Multiple Live Births   1 0 0 0 0      # Outcome Date GA Lbr Chaparro/2nd Weight Sex Type Anes PTL Lv   2 Current            1 SAB 23 6w0d    SAB         Birth Comments: anembryonic pregnancy, took mife/miso   .  Medical History:   Past Medical History:   Diagnosis Date    Anxiety 2024    Varicella    .  Gestational age 39w6d. Vital signs per doc flowsheet. Fetal movement present. Patient reports painful contractions as reason for admission. Support persons spouse Larry is present.  Action: Verbal consent for EFM, external fetal monitors applied. Admission assessment completed. Patient and support persons educated on labor process. Patient instructed to report change in fetal movement, contractions, vaginal leaking of fluid or bleeding, abdominal pain, or any concerns related to the pregnancy to her nurse/physician. Patient oriented to room, call light in reach.   Response: Dr. Whitman saw the patient at bedside. Plan per provider is inpatient admission and expectant management. Patient verbalized understanding of education and verbalized agreement with plan. Patient coping with labor via support person, hot packs, and nitrous gas.

## 2025-03-28 NOTE — PROGRESS NOTES
Provider notification:   Provider: Dr. Veena Whitman MD was at bedside at 1630 regarding:  initiation of second stage of labor.     Second Stage of Labor Algorithm    Passenger:  See flowsheets for fetal heart rate tracing data.   EFM interpretation suggests: absence of concern for metabolic acidemia due to: presence of accelerations and moderate variability. EFM suggests no concern for interruption of the oxygen pathway..    Position  Fetal position:right occiput posterior  Fetal station: +1    Power  See flowsheets for uterine activity data.  Contraction frequency: every 2-3 minutes.   Contraction strength: moderate by palpation.  Maternal current position change frequency:every 15 minutes    Psyche  Epidural / ITN: Yes Where is the patient located?  Maternal pain management: comfortable and coping  Urge to push: present    Plan  After discussion with provider:  Interventions to optimize second stage:active pushing and maternal position change frequency: every 15  Next notification: Will notify in an hour for progress update    ..

## 2025-03-28 NOTE — ANESTHESIA PROCEDURE NOTES
Epidural catheter Procedure Note    Pre-Procedure   Staff -        Anesthesiologist:  Lucio Schneider MD       Resident/Fellow: Shamir Mccloud MD       Performed By: anesthesiologist       Location: OB       Procedure Start/Stop Times: 3/28/2025 12:40 PM and 3/28/2025 1:08 PM       Pre-Anesthestic Checklist: patient identified, IV checked, risks and benefits discussed, informed consent, monitors and equipment checked, pre-op evaluation, at physician/surgeon's request and post-op pain management  Timeout:       Correct Patient: Yes        Correct Procedure: Yes        Correct Site: Yes        Correct Position: Yes   Procedure Documentation  Procedure: epidural catheter         Patient Position: sitting       Patient Prep/Sterile Barriers: sterile gloves, mask, patient draped       Skin prep: Betadine and Chloraprep       Local skin infiltrated with mL of 1% lidocaine.        Insertion Site: L3-4. (midline approach).       Technique: LORT saline and LORT air        Needle Type: katena needle       Needle Gauge: 17.        Needle Length (Inches): 3.5        Catheter: 20 G.          Catheter threaded easily.           Threaded 13 cm at skin.         # of attempts: 1 and  # of redirects:  1    Assessment/Narrative         Paresthesias: No.       Test dose of 5 mL lidocaine 1.5% w/ 1:200,000 epinephrine at 12:58 CDT.        .       Insertion/Infusion Method: LORT saline and LORT air       Aspiration negative for Heme or CSF via Epidural Catheter.    Medication(s) Administered   0.125% bupivacaine 5 mL + fentanyl 20 mcg + NS 5 mL (Epidural) (Mixture components: BUPivacaine HCl (PF) 0.25 % Soln, 5 mL; fentaNYL (PF) 100 MCG/2ML Soln, 20 mcg; sodium chloride 0.9 % Soln, 5 mL) - EPIDURAL   10 mL - 3/28/2025 1:00:00 PM  Medication Administration Time: 3/28/2025 12:40 PM     Comments:  Epi   R/b/a discussed, patient agrees to have an epidural catheter placement.   Local infiltration of skin, advancement of needle without  "paresthesias, excellent Marla to NS.   Catheter advanced without resistance nor paresthesias, negative aspiration for heme or CSF.  Remaining 2 mL epidural test dose diluted with 3 mL sterile water and given via epidural bolus.  Patient tolerated the procedure well, all questions answered.        FOR [facility] ([facility] [place]) ONLY:   Pain Team Contact information: please page the Pain Team Via [employer]. Search \"Pain\". During daytime hours, please page the attending first. At night please page the resident first.      "

## 2025-03-28 NOTE — TELEPHONE ENCOUNTER
Returned call to pt (after confirming with L&D) that [facility] L&D is now open and if she wants to come to [facility] or keep plan for [facility]  Pt opting to come back to [place], gave directions to get to unit  Dr. Trever stone of pt arrival and L&D charge Amee at [facility] notified of pt arrival and hx  Pt verbalized understanding, in agreement with plan, and voiced no further questions.  Jordyn Cummings RN on 3/28/2025 at 10:27 AM

## 2025-03-28 NOTE — PROGRESS NOTES
Patient requested the epidural at 1237. Provider called for placement at 1238. Consent signed, patient taken to the bathroom, and IV fluid started. Patient positioned at 1242 and time out performed. Epidural test dose given at 1258. Serial BP monitoring started. Patient tolerated the procedure well and denies any abnormal sign or symptoms following epidural placement. Continuous infusion of epidural started with dual sign off with provider at 1307.

## 2025-03-28 NOTE — PROGRESS NOTES
[facility]  Labor Progress Note    S:  Patient feels she is having energy with pushing.    O:   Patient Vitals for the past 4 hrs:   BP Temp Temp src Resp SpO2   25 1600 98/56 -- -- -- --   25 1530 -- 97.6  F (36.4  C) Oral 18 98 %   25 1525 111/66 -- -- -- 97 %   25 1500 109/65 -- -- -- 97 %   25 1430 105/61 97.5  F (36.4  C) Oral 18 98 %   25 1420 103/62 -- -- -- --   25 1415 106/64 -- -- -- 98 %   25 1410 102/57 -- -- -- 98 %   25 1405 112/59 -- -- -- 98 %   25 1400 109/60 -- -- -- 98 %   25 1354 107/56 -- -- -- --   25 1352 108/60 -- -- -- --   25 1350 102/59 -- -- -- --   25 1348 106/63 -- -- -- --   25 1346 119/68 -- -- -- 99 %   25 1344 115/71 -- -- -- --   25 1342 115/68 -- -- -- --   25 1340 (!) 85/44 -- -- -- --   25 1336 103/58 -- -- -- 98 %   25 1331 101/59 -- -- -- 99 %   25 1330 99/54 97.8  F (36.6  C) Oral 18 --   25 1325 103/57 -- -- -- 99 %   25 1319 103/60 -- -- -- --   25 1310 100/56 -- -- -- 100 %     SVE: 10/100/pushing to +3 at 1703    FHT: Baseline 140, moderate variability, + accelerations, - decelerations  [place]: 4 contractions in 10 minutes    A/P:  Inga Miner is a 35 year old  at 39w6d here in labor, currently complete and pushing with good maternal effort.    Active Labor:  Pushing with good effort and making progress.     - Pain: comfortable with epidural  - Plan:continue pushing    FWB:  Category I, reactive and reassuring FHT  PNC: Rh pos, GBS neg, see H&P for additional details    Terri Perera MD

## 2025-03-28 NOTE — ANESTHESIA PROCEDURE NOTES
Epidural catheter Procedure Note    Pre-Procedure   Staff -        Anesthesiologist:  Lucio Schneider MD       Resident/Fellow: Shamir Mccloud MD       Performed By: anesthesiologist       Location: OB       Procedure Start/Stop Times: 3/28/2025 12:40 PM and 3/28/2025 1:08 PM       Pre-Anesthestic Checklist: patient identified, IV checked, risks and benefits discussed, informed consent, monitors and equipment checked, pre-op evaluation, at physician/surgeon's request and post-op pain management  Timeout:       Correct Patient: Yes        Correct Procedure: Yes        Correct Site: Yes        Correct Position: Yes   Procedure Documentation  Procedure: epidural catheter         Patient Position: sitting       Patient Prep/Sterile Barriers: sterile gloves, mask, patient draped       Skin prep: Betadine and Chloraprep       Local skin infiltrated with mL of 1% lidocaine.        Insertion Site: L3-4. (midline approach).       Technique: LORT saline and LORT air        Needle Type: Sproxil needle       Needle Gauge: 17.        Needle Length (Inches): 3.5        Catheter: 20 G.          Catheter threaded easily.             # of attempts: 1 and  # of redirects:  0    Assessment/Narrative         Paresthesias: No.       Insertion/Infusion Method: LORT saline and LORT air       Aspiration negative for Heme or CSF via Epidural Catheter.    Medication(s) Administered   Medication Administration Time: 3/28/2025 12:40 PM     Comments:  Epi   R/b/a discussed, patient agrees to have an epidural catheter placement.   Local infiltration of skin, advancement of needle without paresthesias, excellent Marla to NS.   Catheter advanced without resistance nor paresthesias, negative aspiration for heme or CSF.  Remaining 2 mL epidural test dose diluted with 3 mL sterile water and given via epidural bolus.  Patient tolerated the procedure well, all questions answered.        FOR [facility]) ONLY:   Pain Team Contact  "information: please page the Pain Team Via [facility]. Search \"Pain\". During daytime hours, please page the attending first. At night please page the resident first.      "

## 2025-03-28 NOTE — L&D DELIVERY NOTE
OB Vaginal Delivery Note    Inga Miner MRN# [id number]   Age: 35 year old YOB: 1990       GA: 39w6d  GP:   Labor Complications: None  EBL:   mL  Delivery QBL:    Delivery Type: Vaginal, Spontaneous  ROM to Delivery Time: (Delivered) Hours: 20 Minutes: 41  [place] Weight: 3.31 kg (7 lb 4.8 oz)   1 Minute 5 Minute 10 Minute   Apgar Totals: 9   9        JAROD GALVAN;CINTHIA HOWELL;VEENA LAKHANI    L&D Delivery Note:     Inga Miner is a 35 year old now  who presented at 39w6d in spontaneous labor.  Pregnancy was notable for h/o anxiety.  Labor course was uncomplicated.  She progressed to complete at 1609 and pushed for about 1 hour, after which she had a spontaneous vaginal delivery of a vigorous female infant in DERIK position at 1741.  No nuchal cord was noted.  Apgars and weight 3310g.  The infant was placed on the patient's abdomen.  The cord was double clamped after 60 seconds and cut.  A cord segment and cord blood were obtained.  IV pitocin was started.  The placenta was delivered after 25 minutes using gentle traction and suprapubic pressure.  The uterus was noted to be firm after fundal massage.  The perineum was assessed for lacerations and a first degree and left labial laceration were noted.  The first degree was repaired in standard fashion using 3-0 vicryl suture and  labial laceration was repaired with 4-0 suture in simple interrupted stitches. 1% lidocaine was used for anesthesia.  On final examination of the perineum, the repair was noted to be hemostatic.  Total EBL 75ml.  The placenta appeared intact with a 3V umbilical cord.  Dr. Terri Perera was present for the entire procedure.     Veena Whitman MD  Obstetrics and Gynecology, PGY2  2025, 7:38 PM    Physician Attestation   I spent a total of 30 minutes with the patient, personally operating as the resident performed  and laceration repair.       Terri Perera MD  Date of Service  (when I saw the patient): 25         Meagan Miner-Inga [[id number]]      Labor Event Times      Latent labor onset date/time: 3/27/2025 2300    Dilation complete date: 3/28/25 Complete time:  4:09 PM   Start pushing date/time: 3/28/2025 1633          Labor Length      2nd Stage (hrs): 1 (min): 32   3rd Stage (hrs): 0 (min): 26          Labor Events     labor?: No   steroids: None  Labor Type: Spontaneous  Predominate monitoring during 1st stage: intermittent auscultation     Antibiotics received during labor?: No       Rupture date/time: 3/27/25 2100   Rupture type: Spontaneous Rupture of Membranes  Fluid color: Clear  Fluid odor: Normal       Delivery/Placenta Date and Time      Delivery Date: 3/28/25 Delivery Time:  5:41 PM   Placenta Date/Time: 3/28/2025  6:07 PM  Oxytocin given at the time of delivery: after delivery of baby  Delivering clinician: Terri Perera MD   Other personnel present at delivery:  Provider Role   Rosina Arndt RN Delivery Nurse   Estelle Nuñez RN RN Veena Singh MD Resident             Vaginal Counts       Initial count performed by 2 team members:  Two Team Members   Dr. Trever Arndt RN         Needles Suture Needles Sponges (RETIRED) Instruments   Initial counts 2  5    Added to count  2     Relief counts       Final counts 2 2 5            Placed during labor Accounted for at the end of labor   FSE No NA   IUPC No NA   Cervidil No NA                  Final count performed by 2 team members:  Two Team Members   DENILSON Mix Dr.      Final count correct?: Yes  Pre-Birth Team Brief: Complete  Post-Birth Team Debrief: Complete       Apgars    Living status: Living   1 Minute 5 Minute 10 Minute 15 Minute 20 Minute   Skin color: 1  1       Heart rate: 2  2       Reflex irritability: 2  2       Muscle tone: 2  2       Respiratory effort: 2  2       Total: 9  9       Apgars assigned by: [email or web address]       Cord   "    Vessels: 3 Vessels    Cord Complications: None               Cord Blood Disposition: Discard    Gases Sent?: No    Delayed cord clamping?: Yes    Cord Clamping Delay (seconds):  seconds    Stem cell collection?: No           [place] Resuscitation    Methods: None       [place] Measurements      Weight: 7 lb 4.8 oz Length: 1' 8\"     Head circumference: 33 cm           Skin to Skin and Feeding Plan      Skin to skin initiation date/time: 1/3/1841    Skin to skin with: Mother  Skin to skin end date/time:            Labor Events and Shoulder Dystocia    Fetal Tracing Prior to Delivery: Category 1  Shoulder dystocia present?: Neg       Delivery (Maternal) (Provider to Complete) ([id number])    Episiotomy: None  Perineal lacerations: 1st Repaired?: Yes     Labial laceration: left Repaired?: Yes   Repair suture: 3-0 Vicryl, 4-0 Vicryl       Blood Loss  Mother: Inga Miner Ann #[id number]     Start of Mother's Information      Delivery Blood Loss   Intrapartum & Postpartum: 25 - 25    Delivery Admission: 25 - 25         Intrapartum & Postpartum Delivery Admission    Delivery QBL (mL) Hospital Encounter 75 mL 75 mL    Total  75 mL 75 mL               End of Mother's Information  Mother: Inga Miner Ann #[id number]                Delivery - Provider to Complete ([id number])    Delivering clinician: Terri Perera MD  Delivery Type (Choose the 1 that will go to the Birth History): Vaginal, Spontaneous                         Other personnel:  Provider Role   Rosina Arndt RN Delivery Nurse   Estelle Nuñez RN RN Resource Jimenez Ramirez, Norma D, MD Resident                    Placenta    Date/Time: 3/28/2025  6:07 PM  Removal: Spontaneous  Disposition: Hospital disposal             Anesthesia    Method: Nitrous Oxide, Epidural  Cervical dilation at placement: 0-3     Analgesic:  BIRTH HISTORY: ANALGESIC   FENTANYL 2 MCG/ML ROPIVACAINE 0.1% IN  ML EPIDURAL CADD CNR " (USES ROPIVACAINE 0.5% VIAL)                 Presentation and Position      Position: Left Occiput Anterior                     Terri Perera MD

## 2025-03-28 NOTE — PLAN OF CARE
Inga had  at 1741. Postpartum pitocin started after delivery of baby. Placenta delivered at 1807 without complication. Uterus firm at umbilicus. First degree and left labial laceration repaired by provider. Pericare provided and ice pack applied. Total QBL was 75 ml. Baby bonding well with parents.     Report given and care transferred to DENILSON Reina at 1905.

## 2025-03-28 NOTE — TELEPHONE ENCOUNTER
39w6d    Pt called clinic with concerns for being in labor  Pt reports last night lost mucous plug around 8  Started contractions a couple hours later, every 1-2 hours wake up for contractions  7am this morning her contractions got more regular, happening now timing between 3-5 minutes  Getting stronger in strength    Baby movement normal between contractions  Reports some leaking fluid off an on since mucous plug came out, has some tinged blood on toilet paper when she wipes    RN called [facility] L[facility]D to confirm they are still closed and unable to take pt  Explained to pt situation where our unit is unable to take her d/t staff/full of patients and explained options of other hospitals  Pt opted for [facility] since closest  RN called Charge RN at  [facility] L&D--gave report that pt in likely labor and request to take pt to triage/possibly deliver  Charge RN accepted pt but said we sent them one earlier so might need to close after this pt/need to send elsewhere  Asked charge if need to report to a provider and they said no, they would triage/accept and then put pt into resident program and move forward with assigning MD    Explained to pt [facility] L&D would be awaiting her arrival and gave directions charge RN to get to L&D unit there.   Pt verbalized understanding, in agreement with plan, and voiced no further questions.  Jordyn Cummings, RN on 3/28/2025 at 10:05 AM

## 2025-03-28 NOTE — PROGRESS NOTES
Data: Patient presented to [facility]: 3/28/2025 10:44 AM.  Reason for maternal/fetal assessment is uterine contractions, leaking vaginal fluid, and vaginal bleeding. Patient reports painful contractions started last night. Patient denies nausea, vomiting, headache, visual disturbances, epigastric or RUQ pain, significant edema. Patient reports fetal movement is normal. Patient is a 39w6d . Prenatal record reviewed. Pregnancy has been uncomplicated. Support person is present.     Fetal HR baseline was 125, variability is moderate (amplitude range 6 to 25 bpm). Accelerations: increase 15 bpm above baseline lasting 15 seconds. Decelerations: absent. Uterine assessment is moderate by palpation during contractions and soft by palpation at rest. Cervix 2 cm dilated and 90% effaced. Fetal station -2. Fetal presentation cephalic per  BSUS . Membranes: fluid visually apparent externally.    Vital signs wnl. Patient reports pain and is coping with the help of support person.    Action: Verbal consent for EFM. Triage assessment completed. Patient may meet criteria for early labor discharge.     Response: Patient verbalized understanding of triage assessment. Dr. Veena Whitman contacted with assessment and consideration of early labor discharge vs admission.

## 2025-03-28 NOTE — PROGRESS NOTES
[facility]  Labor Progress Note    S:  Patient is feeling pressure.    O:   Patient Vitals for the past 4 hrs:   BP Temp Temp src Resp SpO2   25 1600 98/56 -- -- -- --   25 1530 -- 97.6  F (36.4  C) Oral 18 98 %   25 1525 111/66 -- -- -- 97 %   25 1500 109/65 -- -- -- 97 %   25 1430 105/61 97.5  F (36.4  C) Oral 18 98 %   25 1420 103/62 -- -- -- --   25 1415 106/64 -- -- -- 98 %   25 1410 102/57 -- -- -- 98 %   25 1405 112/59 -- -- -- 98 %   25 1400 109/60 -- -- -- 98 %   25 1354 107/56 -- -- -- --   25 1352 108/60 -- -- -- --   25 1350 102/59 -- -- -- --   25 1348 106/63 -- -- -- --   25 1346 119/68 -- -- -- 99 %   25 1344 115/71 -- -- -- --   25 1342 115/68 -- -- -- --   25 1340 (!) 85/44 -- -- -- --   25 1336 103/58 -- -- -- 98 %   25 1331 101/59 -- -- -- 99 %   25 1330 99/54 97.8  F (36.6  C) Oral 18 --   25 1325 103/57 -- -- -- 99 %   25 1319 103/60 -- -- -- --   25 1310 100/56 -- -- -- 100 %   25 1302 107/69 -- -- -- --   25 1300 130/70 -- -- -- 100 %   25 1259 137/77 -- -- -- --   25 1240 -- 97.8  F (36.6  C) Oral 20 100 %     SVE: 10/100/+1    FHT: Baseline 135, ,oderate variability, + accelerations, - decelerations  [place]: 4-5 contractions in 10 minutes    A/P:  Inga Miner is a 35 year old  at 39w6d here in labor. SROM at home at 2300 yesterday Labor progressing as expected. Now complete and pushing    # Labor:   - Pain: epidural  - Plan: C&P     FWB:  Category I, reactive and reassuring FHT  PNC: Rh pos, GBS neg, see H&P for additional details    Veena Whitman MD  Obstetrics and Gynecology, PGY2  2025, 4:16 PM

## 2025-03-28 NOTE — ANESTHESIA PREPROCEDURE EVALUATION
Anesthesia Pre-Procedure Evaluation    Patient: Inga Miner   MRN: [id number] : 1990        Procedure :           Past Medical History:   Diagnosis Date    Anxiety 2024    Varicella       Past Surgical History:   Procedure Laterality Date    NO HISTORY OF SURGERY        Allergies   Allergen Reactions    Clindamycin Rash      Social History     Tobacco Use    Smoking status: Never     Passive exposure: Never    Smokeless tobacco: Never   Substance Use Topics    Alcohol use: Not Currently      Wt Readings from Last 1 Encounters:   25 83.5 kg (184 lb)        Anesthesia Evaluation   Pt has not had prior anesthetic         ROS/MED HX  ENT/Pulmonary:  - neg pulmonary ROS     Neurologic:  - neg neurologic ROS     Cardiovascular:     (+) Dyslipidemia - -   -  - -                                      METS/Exercise Tolerance:     Hematologic:  - neg hematologic  ROS     Musculoskeletal:  - neg musculoskeletal ROS     GI/Hepatic:     (+) GERD (slight, none today),                   Renal/Genitourinary:  - neg Renal ROS     Endo:  - neg endo ROS     Psychiatric/Substance Use:     (+) psychiatric history anxiety       Infectious Disease:  - neg infectious disease ROS     Malignancy:       Other:   39w6d    (-) previous        Physical Exam    Airway        Mallampati: II   TM distance: > 3 FB   Neck ROM: full   Mouth opening: > 3 cm    Respiratory Devices and Support         Dental       (+) Completely normal teeth      Cardiovascular   cardiovascular exam normal       Rhythm and rate: regular and normal     Pulmonary   pulmonary exam normal        breath sounds clear to auscultation           OUTSIDE LABS:  CBC:   Lab Results   Component Value Date    WBC 16.4 (H) 2025    WBC 10.5 2025    HGB 13.2 2025    HGB 12.6 2025    HCT 36.8 2025    HCT 37.0 2025     2025     2025     BMP:   Lab Results   Component Value Date      "09/26/2024     02/07/2024    POTASSIUM 4.3 09/26/2024    POTASSIUM 3.3 (L) 02/07/2024    CHLORIDE 103 09/26/2024    CHLORIDE 95 (L) 02/07/2024    CO2 22 09/26/2024    CO2 26 02/07/2024    BUN 8.9 09/26/2024    BUN 6.3 02/07/2024    CR 0.55 09/26/2024    CR 0.61 02/07/2024    GLC 86 09/26/2024     (H) 02/07/2024     COAGS: No results found for: \"PTT\", \"INR\", \"FIBR\"  POC:   Lab Results   Component Value Date    HCG Negative 06/03/2024     HEPATIC:   Lab Results   Component Value Date    ALBUMIN 3.9 09/26/2024    PROTTOTAL 6.8 09/26/2024    ALT 14 09/26/2024    AST 17 09/26/2024    ALKPHOS 43 09/26/2024    BILITOTAL 0.3 09/26/2024     OTHER:   Lab Results   Component Value Date    A1C 4.7 08/19/2024    VINAYAK 9.4 09/26/2024    LIPASE 69 (H) 01/29/2024    TSH 0.53 09/26/2024    SED 21 (H) 09/26/2024       Anesthesia Plan    ASA Status:  2       Anesthesia Type: Epidural.              Consents    Anesthesia Plan(s) and associated risks, benefits, and realistic alternatives discussed. Questions answered and patient/representative(s) expressed understanding.     - Discussed:     - Discussed with:  Patient            Postoperative Care            Comments:               Wilfredo Martins MD    Clinically Significant Risk Factors Present on Admission                                          "

## 2025-03-29 VITALS
RESPIRATION RATE: 17 BRPM | BODY MASS INDEX: 28.29 KG/M2 | SYSTOLIC BLOOD PRESSURE: 120 MMHG | HEART RATE: 71 BPM | WEIGHT: 169.97 LBS | OXYGEN SATURATION: 99 % | DIASTOLIC BLOOD PRESSURE: 75 MMHG | TEMPERATURE: 98.1 F

## 2025-03-29 LAB — HGB BLD-MCNC: 11.6 G/DL (ref 11.7–15.7)

## 2025-03-29 PROCEDURE — 90471 IMMUNIZATION ADMIN: CPT

## 2025-03-29 PROCEDURE — 90707 MMR VACCINE SC: CPT

## 2025-03-29 PROCEDURE — 85018 HEMOGLOBIN: CPT

## 2025-03-29 PROCEDURE — 86762 RUBELLA ANTIBODY: CPT

## 2025-03-29 PROCEDURE — 36415 COLL VENOUS BLD VENIPUNCTURE: CPT

## 2025-03-29 PROCEDURE — 250N000013 HC RX MED GY IP 250 OP 250 PS 637

## 2025-03-29 PROCEDURE — 250N000011 HC RX IP 250 OP 636

## 2025-03-29 RX ADMIN — IBUPROFEN 800 MG: 800 TABLET, FILM COATED ORAL at 16:04

## 2025-03-29 RX ADMIN — PSYLLIUM HUSK 1 PACKET: 3.4 POWDER ORAL at 07:43

## 2025-03-29 RX ADMIN — ACETAMINOPHEN 650 MG: 325 TABLET, FILM COATED ORAL at 14:02

## 2025-03-29 RX ADMIN — CITALOPRAM HYDROBROMIDE 20 MG: 20 TABLET ORAL at 08:02

## 2025-03-29 RX ADMIN — IBUPROFEN 800 MG: 800 TABLET, FILM COATED ORAL at 03:48

## 2025-03-29 RX ADMIN — IBUPROFEN 800 MG: 800 TABLET, FILM COATED ORAL at 09:55

## 2025-03-29 RX ADMIN — ACETAMINOPHEN 650 MG: 325 TABLET, FILM COATED ORAL at 17:57

## 2025-03-29 RX ADMIN — ACETAMINOPHEN 650 MG: 325 TABLET, FILM COATED ORAL at 03:48

## 2025-03-29 RX ADMIN — ACETAMINOPHEN 650 MG: 325 TABLET, FILM COATED ORAL at 07:43

## 2025-03-29 RX ADMIN — MEASLES, MUMPS, AND RUBELLA VIRUS VACCINE LIVE 0.5 ML: 1000; 12500; 1000 INJECTION, POWDER, LYOPHILIZED, FOR SUSPENSION SUBCUTANEOUS at 20:25

## 2025-03-29 ASSESSMENT — ACTIVITIES OF DAILY LIVING (ADL)
ADLS_ACUITY_SCORE: 26
ADLS_ACUITY_SCORE: 19
ADLS_ACUITY_SCORE: 26
ADLS_ACUITY_SCORE: 19
ADLS_ACUITY_SCORE: 19
ADLS_ACUITY_SCORE: 26
ADLS_ACUITY_SCORE: 19
ADLS_ACUITY_SCORE: 26
ADLS_ACUITY_SCORE: 19
ADLS_ACUITY_SCORE: 19
ADLS_ACUITY_SCORE: 26
ADLS_ACUITY_SCORE: 19

## 2025-03-29 NOTE — PLAN OF CARE
Goal Outcome Evaluation:           VSS and postpartum assessment WDL. Pain adequately managed with tylenol and ibuprofen. Abdi catheter placed for bladder rest due to acute urinary retention, see note. Breastfeeding independently with good latch. Bonding well with . Will continue with current plan of care.       Plan of Care Reviewed With: patient, partner     Overall Patient Progress: improving           Problem: Postpartum (Vaginal Delivery)  Goal: Optimal Pain Control and Function  Outcome: Progressing     Problem: Postpartum (Vaginal Delivery)  Goal: Effective Urinary Elimination  Outcome: Not Progressing

## 2025-03-29 NOTE — DISCHARGE SUMMARY
[facility]  Discharge Summary    Inga Miner MRN# [id number]   Age: 35 year old YOB: 1990     Date of Admission:  3/28/2025  Date of Discharge::  3/29/2025  Admitting Physician:  Terri Perera MD  Discharge Physician:  Tara Lu MD        Admission Diagnosis:   Intrauterine pregnancy at 39w6d  Spontaneous labor  AMA  Anxiety  Chronic hives  Rubella equivocal        Discharge Diagnosis:   Same, delivered   Postpartum urinary retention; resolved   MRBP x1        Procedures:   Spontaneous Vaginal Delivery       Medications Prior to Admission:     Medications Prior to Admission   Medication Sig Dispense Refill Last Dose/Taking    acetaminophen (TYLENOL) 500 MG tablet Take 1-2 tablets (500-1,000 mg) by mouth every 6 hours as needed for mild pain. 100 tablet 0     Cetirizine HCl (ZYRTEC PO) Take 1 tablet by mouth 2 times daily.       citalopram (CELEXA) 20 MG tablet Take 1 tablet (20 mg) by mouth daily 90 tablet 1     ibuprofen (ADVIL/MOTRIN) 600 MG tablet Take 1 tablet (600 mg) by mouth every 6 hours as needed for moderate pain. Post-partum pain control 60 tablet 0     MAGNESIUM PO Take 1 tablet by mouth daily.       Prenatal Vit-DSS-Fe Cbn-FA (PRENATAL AD PO)        SENNA-docusate sodium (SENNA S) 8.6-50 MG tablet Take 1 tablet by mouth at bedtime. 100 tablet 0         Discharge Medications:        Review of your medicines        UNREVIEWED medicines. Ask your doctor about these medicines        Dose / Directions   acetaminophen 500 MG tablet  Commonly known as: TYLENOL  Used for: Routine postpartum follow-up      Dose: 500-1,000 mg  Take 1-2 tablets (500-1,000 mg) by mouth every 6 hours as needed for mild pain.  Quantity: 100 tablet  Refills: 0     citalopram 20 MG tablet  Commonly known as: celeXA  Used for: Anxiety      Dose: 20 mg  Take 1 tablet (20 mg) by mouth daily  Quantity: 90 tablet  Refills: 1     ibuprofen 600 MG tablet  Commonly known  as: ADVIL/MOTRIN  Used for: Routine postpartum follow-up      Dose: 600 mg  Take 1 tablet (600 mg) by mouth every 6 hours as needed for moderate pain. Post-partum pain control  Quantity: 60 tablet  Refills: 0     MAGNESIUM PO      Dose: 1 tablet  Take 1 tablet by mouth daily.  Refills: 0     PRENATAL AD PO      Refills: 0     SENNA-docusate sodium 8.6-50 MG tablet  Commonly known as: SENNA S  Used for: Routine postpartum follow-up      Dose: 1 tablet  Take 1 tablet by mouth at bedtime.  Quantity: 100 tablet  Refills: 0     ZYRTEC PO      Dose: 1 tablet  Take 1 tablet by mouth 2 times daily.  Refills: 0                Consultations:   Anesthesia  Lactation        Admission History & Intrapartum Course:   Inga Miner is a 35 year old now  who presented at 39w6d in spontaneous labor.  Pregnancy was notable for h/o anxiety.  Labor course was uncomplicated.  She progressed to complete at 1609 and pushed for about 1 hour, after which she had a spontaneous vaginal delivery of a vigorous female infant in DERIK position at 1741.  No nuchal cord was noted.  Apgars and weight 3310g.  The infant was placed on the patient's abdomen.  The cord was double clamped after 60 seconds and cut.  A cord segment and cord blood were obtained.  IV pitocin was started.  The placenta was delivered after 25 minutes using gentle traction and suprapubic pressure.  The uterus was noted to be firm after fundal massage.  The perineum was assessed for lacerations and a first degree and left labial laceration were noted.  The first degree was repaired in standard fashion using 3-0 vicryl suture and  labial laceration was repaired with 4-0 suture in simple interrupted stitches. 1% lidocaine was used for anesthesia.  On final examination of the perineum, the repair was noted to be hemostatic.  Total EBL 75ml.  The placenta appeared intact with a 3V umbilical cord.  Dr. Terri Perera was present for the entire procedure.     See admission H&P  and delivery note for full details.       Postpartum Course:   The patient's postpartum course was complicated only by postpartum urinary retention. She had straight catheterization x2 and heller replaced x12h. Subsequently passed active trial of void. She recovered as anticipated and experienced no additional complications. On discharge, her pain was well controlled. Vaginal bleeding was less than or similar to peak menstrual flow. She was spontaneously voiding without difficulty, ambulating well without dizziness, tolerating a normal diet without nausea or vomiting, and passing flatus. She was breastfeeding. She desired condoms for birth control. Rhogam was not indicated. She was discharged on postpartum day #1.    Postpartum hemoglobin:   Hemoglobin   Date Value Ref Range Status   03/28/2025 13.2 11.7 - 15.7 g/dL Final        Discharge Instructions and Follow-Up:     Discharge diet: Regular   Discharge activity: Pelvic rest for 6 weeks including no sexual intercourse, tampons, or douching.   Call for temperature greater than 100.4 F, heavy vaginal bleeding, worsening abdominal pain, inability to void, nausea or vomiting, or foul smelling vaginal discharge.   Discharge follow-up: 6 week routine postpartum visit         Discharge Disposition:   Discharged to home in stable condition.    Amber Portlilo MD  Ob/Gyn PGY-3  03/29/25 2:23 PM

## 2025-03-29 NOTE — PROVIDER NOTIFICATION
03/28/25 1729   Provider Notification   Provider Name/Title Dr. Whitman   Method of Notification Electronic Page   Request Evaluate in Person   Notification Reason Labor Status     Provider called to attend delivery.

## 2025-03-29 NOTE — PLAN OF CARE
Data: Inga Miner transferred to [id number] via wheelchair at 2015. Baby transferred via parent's arms.  Action: Receiving unit notified of transfer: Yes. Patient and family notified of room change. Report given to DENILSON Adams at 1950. Belongings sent to receiving unit. Accompanied by Registered Nurse. Oriented patient to surroundings. Call light within reach. ID bands double-checked with receiving RN.  Response: Patient tolerated transfer and is stable.

## 2025-03-29 NOTE — PLAN OF CARE
Patient arrived to [facility] unit via wheelchair at 2025 ,with belongings, accompanied by spouse/ significant other, with infant in arms. Received transfer updates from  Glenys OREILLY  and checked bands. Unit and room orientation started. Call light given and within arms reach; no concerns present at this time. Continue with plan of care.      Updates given to primary RN assuming care on [facility], Angie Aguirre.

## 2025-03-29 NOTE — PROVIDER NOTIFICATION
03/29/25 0833   Provider Notification   Provider Name/Title Dr. Mazariegos (G1)   Method of Notification Electronic Page   Request Evaluate-Remote   Notification Reason Other     Contacted by provider regarding plan for heller removal. Plan for heller removal at 1200 with trial of void. Plan to instill 300mL of saline into bladder and have patient void within 30mins, provider would like to be notified of void amount and post-residual bladder scan amount.    Notified provider at 12:33- Patient voided 400mL after instilling 300mL of saline. She had 246mL on her post-residual scan. Discussed with provider that patient would like to attempt voiding again and see what her post-residual scan is. Provider ok with attempting void again.     Updated provider at 13:37- Patient voided 500mL and post residual scan was 122mL. OK to discontinue checking post-void residuals. Plan is for patient to discharge home this evening.

## 2025-03-29 NOTE — PROGRESS NOTES
[facility]  Postpartum Progress Note    S: She is resting comfortably in bed this morning without complaints. She is tolerating a regular diet without nausea or vomiting. Her pain is well controlled. She is passing flatus, no BM yet. She is ambulating without dizziness. Her lochia is appropriate. Abdi was replaced overnight due to urinary retention. She has no symptoms of headache, changes in vision, nausea/vomiting, chest pain, shortness of breath, RUQ pain, or worsening edema. She is working on breast feeding. She would like to discharge home later today after removal of Abdi catheter if possible.     O:  Patient Vitals for the past 24 hrs:   BP Temp Temp src Pulse Resp SpO2   03/29/25 0743 120/79 97.7  F (36.5  C) -- 70 12 --   03/29/25 0348 121/76 98.4  F (36.9  C) Oral 73 16 --   03/28/25 2326 106/71 98.3  F (36.8  C) Oral 83 16 --   03/28/25 2030 125/78 98.1  F (36.7  C) Oral -- 16 99 %   03/28/25 2007 122/70 -- -- -- -- --   03/28/25 1952 109/66 -- -- -- -- --   03/28/25 1937 106/64 -- -- -- -- 99 %   03/28/25 1922 (!) 149/75 -- -- -- -- 100 %   03/28/25 1907 127/66 -- -- -- -- 99 %   03/28/25 1852 120/68 -- -- -- -- 99 %   03/28/25 1837 125/62 -- -- -- -- --   03/28/25 1822 98/54 -- -- -- -- --   03/28/25 1807 111/52 -- -- -- -- --   03/28/25 1800 -- 98.2  F (36.8  C) Oral -- 18 --   03/28/25 1730 -- -- -- -- -- 99 %   03/28/25 1700 -- -- -- -- -- 99 %   03/28/25 1630 129/76 -- -- -- -- 99 %   03/28/25 1600 98/56 -- -- -- -- --   03/28/25 1530 -- 97.6  F (36.4  C) Oral -- 18 98 %   03/28/25 1525 111/66 -- -- -- -- 97 %   03/28/25 1500 109/65 -- -- -- -- 97 %   03/28/25 1430 105/61 97.5  F (36.4  C) Oral -- 18 98 %   03/28/25 1420 103/62 -- -- -- -- --   03/28/25 1415 106/64 -- -- -- -- 98 %   03/28/25 1410 102/57 -- -- -- -- 98 %   03/28/25 1405 112/59 -- -- -- -- 98 %   03/28/25 1400 109/60 -- -- -- -- 98 %   03/28/25 1354 107/56 -- -- -- -- --   03/28/25 1352 108/60 -- -- --  -- --   25 1350 102/59 -- -- -- -- --   25 1348 106/63 -- -- -- -- --   25 1346 119/68 -- -- -- -- 99 %   25 1344 115/71 -- -- -- -- --   25 1342 115/68 -- -- -- -- --   25 1340 (!) 85/44 -- -- -- -- --   25 1336 103/58 -- -- -- -- 98 %   25 1331 101/59 -- -- -- -- 99 %   25 1330 99/54 97.8  F (36.6  C) Oral -- 18 --   25 1325 103/57 -- -- -- -- 99 %   25 1319 103/60 -- -- -- -- --   25 1310 100/56 -- -- -- -- 100 %   25 1302 107/69 -- -- -- -- --   25 1300 130/70 -- -- -- -- 100 %   25 1259 137/77 -- -- -- -- --   25 1240 -- 97.8  F (36.6  C) Oral -- 20 100 %   25 1115 132/86 98.5  F (36.9  C) Oral -- 20 --     Gen:  Resting comfortably, NAD  CV:  Regular rate, well perfused  Pulm:  Non-labored breathing on room air  Abd:  Soft, non-distended with firm, non-tender fundus below the umbilicus     I/O last 3 completed shifts:  In: -   Out: 5410 [Urine:5335; Blood:75]    A/P: 35 year old  who is PPD#1 s/p uncomplicated vaginal delivery. Stable in the postpartum period.    Anxiety  - PTA Celexa    MR BP x1  - Patient had 1 mild range blood pressure yesterday evening, but has been normotensive most recently.  - Will obtain HEL labs if additional mild range blood pressures.     POUR  - Abdi replaced at 0000, will plan for removal at 1200 with aTOV.     Postpartum care  Pain:    Continue PRN Tylenol, ibuprofen  GI:    PRN bowel regimen, anti-emetics  :    Abdi in place as above.   Heme:    Hgb 13.2> EBL 75> AM Hgb pending. Asymptomatic from acute blood loss. If Hgb <10, will discharge home with oral iron.  Rh:   Positive. No intervention required.  Rubella:  Equivocal - will offer MMR prior to discharge  Feeding: Breast  BC:    Desires condoms   PPx:    Encourage ambulation, SCDs while confined to bed    Medically Ready for Discharge: Anticipated Today     Oanh Mazariegos MD PGY1  [facility]  [place]  Obstetrics, Gynecology, & Women's Health

## 2025-03-29 NOTE — PLAN OF CARE
Goal Outcome Evaluation:      Plan of Care Reviewed With: patient, spouse    Overall Patient Progress: improvingOverall Patient Progress: improving    Data: Vital signs within normal limits. Postpartum checks within normal limits - see flow record. Patient eating and drinking normally. Patient able to empty bladder independently and is up ambulating. No apparent signs of infection. Patient performing self cares and is able to care for infant. Breastfeeding on demand, latch checked.   Action: Pain has been adequately managed with oral medications.   Response: Positive attachment behaviors observed with infant. Support person, : gopi Juarez.   Plan: Anticipate discharge this evening if baby is medically ready to discharge.

## 2025-03-30 NOTE — PLAN OF CARE
VSS no complaints of pain at time of discharge, Family here to drive patient home, wheeled to exit. No medications to fill for pharmacy. Pt states all meds, paperwork and belongings are accounted for at time of discharge. No further questions. Baby bands double checked.

## 2025-03-31 LAB
RUBV IGG SERPL QL IA: 0.73 INDEX
RUBV IGG SERPL QL IA: NORMAL

## 2025-05-12 ENCOUNTER — PRENATAL OFFICE VISIT (OUTPATIENT)
Dept: OBGYN | Facility: CLINIC | Age: 35
End: 2025-05-12
Payer: COMMERCIAL

## 2025-05-12 VITALS
SYSTOLIC BLOOD PRESSURE: 118 MMHG | DIASTOLIC BLOOD PRESSURE: 77 MMHG | HEART RATE: 78 BPM | WEIGHT: 154.1 LBS | BODY MASS INDEX: 25.64 KG/M2

## 2025-05-12 DIAGNOSIS — M62.08 DIASTASIS RECTI: ICD-10-CM

## 2025-05-12 DIAGNOSIS — F41.9 ANXIETY: ICD-10-CM

## 2025-05-12 PROCEDURE — 99213 OFFICE O/P EST LOW 20 MIN: CPT

## 2025-05-12 PROCEDURE — 3074F SYST BP LT 130 MM HG: CPT

## 2025-05-12 PROCEDURE — 3078F DIAST BP <80 MM HG: CPT

## 2025-05-12 PROCEDURE — 0503F POSTPARTUM CARE VISIT: CPT

## 2025-05-12 PROCEDURE — 99207 PR POST PARTUM EXAM: CPT

## 2025-05-12 RX ORDER — CITALOPRAM HYDROBROMIDE 20 MG/1
20 TABLET ORAL DAILY
Qty: 30 TABLET | Refills: 1 | Status: SHIPPED | OUTPATIENT
Start: 2025-05-12

## 2025-05-12 ASSESSMENT — PATIENT HEALTH QUESTIONNAIRE - PHQ9: SUM OF ALL RESPONSES TO PHQ QUESTIONS 1-9: 1

## 2025-05-12 NOTE — PROGRESS NOTES
SUBJECTIVE:   Inga Miner is here for her 6-week postpartum checkup. She is now     HPI: Inga came into labor in spontaneous labor. Her labor course was uncomplicated. She had a first degree laceration that was repaired.    DELIVERY DATE: 3/28/25   of a viable girl, WILMER weight 7 pounds 4.8 oz., with none complications.  FEEDING METHOD:Breastfeeding exclusively    CONTRACEPTION PLANNED: none, condoms  She  has not had intercourse since delivery.  She has not resumed menstruating.    DEPRESSION: Pt denies   depression.  Today's Depression Rating was: PATIENT   HEALTH QUESTIONNAIRE-9 (PHQ - 9)  PATIENT HEALTH QUESTIONNAIRE-9 (PHQ - 9)    Over the last 2 weeks, how often have you been bothered by any of the following problems?    1. Little interest or pleasure in doing things -  Not at all   2. Feeling down, depressed, or hopeless -  Not at all   3. Trouble falling or staying asleep, or sleeping too much - Not at all   4. Feeling tired or having little energy -  Several days   5. Poor appetite or overeating -  Not at all   6. Feeling bad about yourself - or that you are a failure or have let yourself or your family down -  Not at all   7. Trouble concentrating on things, such as reading the newspaper or watching television - Not at all   8. Moving or speaking so slowly that other people could have noticed? Or the opposite - being so fidgety or restless that you have been moving around a lot more than usual Not at all   9. Thoughts that you would be better off dead or of hurting  yourself in some way Not at all   Total Score: 1     If you checked off any problems, how difficult have these problems made it for you to do your work, take care of things at home, or get along with other people? Not difficult at all    Developed by Matias Rodriguez, Mariza Hollins, Anmol Engel and colleagues, with an educational db from [employer] No permission required to reproduce, translate, display or  distribute. permission required to reproduce, translate, display or distribute.     OTHER HPI: She has no signs of infection, bleeding or other complications.   Denies difficulty voiding or with bowel movements.  Reports epis/lac is healing well.    EXAM:  /77   Pulse 78   Wt 69.9 kg (154 lb 1.6 oz)   LMP 2024   Breastfeeding Yes   BMI 25.64 kg/m      GENERAL APPEARANCE: healthy, alert and no distress  NECK: thyroid normal to palpation,  BREAST: deferred, no concerns, lactating  ABDOMEN: soft, nontender, diastasis recti closing, 1 finger breath of separation  PSYCH: mentation appears normal and affect normal/bright  PELVIC EXAM:  Vulva: BUS WNL, no lesions noted,   Vagina:  no lesions, well rugated, good tone,   Rectal exam: external hemorrhoids noted, internal exam deferred      ASSESSMENT:   Normal postpartum exam after .    PLAN:  (Z39.2) Routine postpartum follow-up  (primary encounter diagnosis)  Comment: without abnormal findings  Plan: RTC annually or PRN    (M62.08) Diastasis recti  Comment: would like PT evaluation and treatment after pregnancy and birth  Plan: Physical Therapy  Referral            (F41.9) Anxiety  Comment: establishing care with a new psychiatrist, will refill so no lapse in medication  Plan: citalopram (CELEXA) 20 MG tablet            Birth Control discussed, will use condoms. Fertility reviewed.    Return as needed or at time interval for next routine pap, pelvic, or breast exam.  Slow, steady weight loss. Slow resumption to normal exercise.  Continue a multi vitamin supplement, especially if breastfeeding.  Recommend using lubrication with intercourse due to breastfeeding and healing vagina and uterus.    ANAY Portillo CNP

## 2025-08-06 ENCOUNTER — PATIENT OUTREACH (OUTPATIENT)
Dept: CARE COORDINATION | Facility: CLINIC | Age: 35
End: 2025-08-06
Payer: COMMERCIAL